# Patient Record
Sex: FEMALE | Race: WHITE | NOT HISPANIC OR LATINO | Employment: OTHER | URBAN - METROPOLITAN AREA
[De-identification: names, ages, dates, MRNs, and addresses within clinical notes are randomized per-mention and may not be internally consistent; named-entity substitution may affect disease eponyms.]

---

## 2017-01-23 ENCOUNTER — OFFICE VISIT - HEALTHEAST (OUTPATIENT)
Dept: MIDWIFE SERVICES | Facility: CLINIC | Age: 32
End: 2017-01-23

## 2017-01-23 DIAGNOSIS — Z30.431 IUD SURVEILLANCE: ICD-10-CM

## 2017-01-23 ASSESSMENT — MIFFLIN-ST. JEOR: SCORE: 1615.75

## 2017-02-20 ENCOUNTER — COMMUNICATION - HEALTHEAST (OUTPATIENT)
Dept: MIDWIFE SERVICES | Facility: CLINIC | Age: 32
End: 2017-02-20

## 2017-02-20 ENCOUNTER — COMMUNICATION - HEALTHEAST (OUTPATIENT)
Dept: FAMILY MEDICINE | Facility: CLINIC | Age: 32
End: 2017-02-20

## 2017-02-20 DIAGNOSIS — F32.A DEPRESSION: ICD-10-CM

## 2017-09-11 ENCOUNTER — COMMUNICATION - HEALTHEAST (OUTPATIENT)
Dept: FAMILY MEDICINE | Facility: CLINIC | Age: 32
End: 2017-09-11

## 2017-09-11 DIAGNOSIS — F32.A DEPRESSION: ICD-10-CM

## 2017-11-14 ENCOUNTER — COMMUNICATION - HEALTHEAST (OUTPATIENT)
Dept: FAMILY MEDICINE | Facility: CLINIC | Age: 32
End: 2017-11-14

## 2017-11-14 DIAGNOSIS — F32.A DEPRESSION: ICD-10-CM

## 2017-12-29 ENCOUNTER — RECORDS - HEALTHEAST (OUTPATIENT)
Dept: ADMINISTRATIVE | Facility: OTHER | Age: 32
End: 2017-12-29

## 2018-01-17 ENCOUNTER — COMMUNICATION - HEALTHEAST (OUTPATIENT)
Dept: ADMINISTRATIVE | Facility: CLINIC | Age: 33
End: 2018-01-17

## 2018-01-17 ENCOUNTER — COMMUNICATION - HEALTHEAST (OUTPATIENT)
Dept: FAMILY MEDICINE | Facility: CLINIC | Age: 33
End: 2018-01-17

## 2018-01-22 ENCOUNTER — AMBULATORY - HEALTHEAST (OUTPATIENT)
Dept: FAMILY MEDICINE | Facility: CLINIC | Age: 33
End: 2018-01-22

## 2018-01-25 ENCOUNTER — COMMUNICATION - HEALTHEAST (OUTPATIENT)
Dept: MIDWIFE SERVICES | Facility: CLINIC | Age: 33
End: 2018-01-25

## 2018-01-29 ENCOUNTER — COMMUNICATION - HEALTHEAST (OUTPATIENT)
Dept: MIDWIFE SERVICES | Facility: CLINIC | Age: 33
End: 2018-01-29

## 2018-02-26 ENCOUNTER — OFFICE VISIT - HEALTHEAST (OUTPATIENT)
Dept: MIDWIFE SERVICES | Facility: CLINIC | Age: 33
End: 2018-02-26

## 2018-02-26 ENCOUNTER — HOSPITAL ENCOUNTER (OUTPATIENT)
Dept: LAB | Age: 33
Setting detail: SPECIMEN
Discharge: HOME | End: 2018-02-26

## 2018-02-26 DIAGNOSIS — F43.21 SITUATIONAL DEPRESSION: ICD-10-CM

## 2018-02-26 DIAGNOSIS — Z01.419 WELL WOMAN EXAM: ICD-10-CM

## 2018-02-26 ASSESSMENT — MIFFLIN-ST. JEOR: SCORE: 1531.26

## 2018-02-27 LAB
HPV SOURCE: NORMAL
HUMAN PAPILLOMA VIRUS 16 DNA: NEGATIVE
HUMAN PAPILLOMA VIRUS 18 DNA: NEGATIVE
HUMAN PAPILLOMA VIRUS FINAL DIAGNOSIS: NORMAL
HUMAN PAPILLOMA VIRUS OTHER HR: NEGATIVE
SPECIMEN DESCRIPTION: NORMAL

## 2018-03-01 LAB
BKR LAB AP ABNORMAL BLEEDING: NO
BKR LAB AP BIRTH CONTROL/HORMONES: NORMAL
BKR LAB AP CERVICAL APPEARANCE: NORMAL
BKR LAB AP GYN ADEQUACY: NORMAL
BKR LAB AP GYN INTERPRETATION: NORMAL
BKR LAB AP HPV REFLEX: NORMAL
BKR LAB AP LMP: NORMAL
BKR LAB AP PATIENT STATUS: NORMAL
BKR LAB AP PREVIOUS ABNORMAL: NORMAL
BKR LAB AP PREVIOUS NORMAL: 2013
HIGH RISK?: NO
PATH REPORT.COMMENTS IMP SPEC: NORMAL
RESULT FLAG (HE HISTORICAL CONVERSION): NORMAL

## 2018-03-24 ENCOUNTER — COMMUNICATION - HEALTHEAST (OUTPATIENT)
Dept: FAMILY MEDICINE | Facility: CLINIC | Age: 33
End: 2018-03-24

## 2018-03-24 DIAGNOSIS — F32.A DEPRESSION: ICD-10-CM

## 2018-04-10 ENCOUNTER — COMMUNICATION - HEALTHEAST (OUTPATIENT)
Dept: MIDWIFE SERVICES | Facility: CLINIC | Age: 33
End: 2018-04-10

## 2018-04-10 ENCOUNTER — COMMUNICATION - HEALTHEAST (OUTPATIENT)
Dept: FAMILY MEDICINE | Facility: CLINIC | Age: 33
End: 2018-04-10

## 2018-08-21 ENCOUNTER — OFFICE VISIT - HEALTHEAST (OUTPATIENT)
Dept: FAMILY MEDICINE | Facility: CLINIC | Age: 33
End: 2018-08-21

## 2018-08-21 DIAGNOSIS — Z13.0 SCREENING FOR DEFICIENCY ANEMIA: ICD-10-CM

## 2018-08-21 DIAGNOSIS — F43.25 ADJUSTMENT REACTION WITH MIXED DISTURBANCE OF EMOTIONS AND CONDUCT: ICD-10-CM

## 2018-08-21 LAB
FERRITIN SERPL-MCNC: 31 NG/ML (ref 10–130)
IRON SATN MFR SERPL: 23 % (ref 20–50)
IRON SERPL-MCNC: 76 UG/DL (ref 42–175)
TIBC SERPL-MCNC: 332 UG/DL (ref 313–563)
TRANSFERRIN SERPL-MCNC: 265 MG/DL (ref 212–360)
VIT B12 SERPL-MCNC: 443 PG/ML (ref 213–816)

## 2018-08-21 ASSESSMENT — MIFFLIN-ST. JEOR: SCORE: 1508.58

## 2018-08-22 LAB — 25(OH)D3 SERPL-MCNC: 35.9 NG/ML (ref 30–80)

## 2018-08-26 ENCOUNTER — COMMUNICATION - HEALTHEAST (OUTPATIENT)
Dept: FAMILY MEDICINE | Facility: CLINIC | Age: 33
End: 2018-08-26

## 2018-12-31 ENCOUNTER — OFFICE VISIT - HEALTHEAST (OUTPATIENT)
Dept: FAMILY MEDICINE | Facility: CLINIC | Age: 33
End: 2018-12-31

## 2018-12-31 DIAGNOSIS — J02.0 STREPTOCOCCAL SORE THROAT: ICD-10-CM

## 2018-12-31 LAB — DEPRECATED S PYO AG THROAT QL EIA: ABNORMAL

## 2019-02-18 ENCOUNTER — COMMUNICATION - HEALTHEAST (OUTPATIENT)
Dept: MIDWIFE SERVICES | Facility: CLINIC | Age: 34
End: 2019-02-18

## 2019-06-14 ENCOUNTER — OFFICE VISIT - HEALTHEAST (OUTPATIENT)
Dept: FAMILY MEDICINE | Facility: CLINIC | Age: 34
End: 2019-06-14

## 2019-06-14 DIAGNOSIS — F41.9 ANXIETY: ICD-10-CM

## 2019-06-14 ASSESSMENT — MIFFLIN-ST. JEOR: SCORE: 1458.12

## 2019-06-17 ENCOUNTER — OFFICE VISIT - HEALTHEAST (OUTPATIENT)
Dept: MIDWIFE SERVICES | Facility: CLINIC | Age: 34
End: 2019-06-17

## 2019-06-17 DIAGNOSIS — Z01.419 WELL WOMAN EXAM: ICD-10-CM

## 2019-06-17 DIAGNOSIS — F43.21 SITUATIONAL DEPRESSION: ICD-10-CM

## 2019-06-17 DIAGNOSIS — Z23 NEED FOR VACCINATION: ICD-10-CM

## 2019-06-17 ASSESSMENT — MIFFLIN-ST. JEOR: SCORE: 1470.6

## 2019-08-05 ENCOUNTER — COMMUNICATION - HEALTHEAST (OUTPATIENT)
Dept: FAMILY MEDICINE | Facility: CLINIC | Age: 34
End: 2019-08-05

## 2019-08-05 DIAGNOSIS — F41.9 ANXIETY: ICD-10-CM

## 2019-08-07 ENCOUNTER — COMMUNICATION - HEALTHEAST (OUTPATIENT)
Dept: FAMILY MEDICINE | Facility: CLINIC | Age: 34
End: 2019-08-07

## 2019-08-08 ENCOUNTER — AMBULATORY - HEALTHEAST (OUTPATIENT)
Dept: FAMILY MEDICINE | Facility: CLINIC | Age: 34
End: 2019-08-08

## 2019-08-08 DIAGNOSIS — F43.21 SITUATIONAL DEPRESSION: ICD-10-CM

## 2019-08-08 DIAGNOSIS — F41.9 ANXIETY: ICD-10-CM

## 2019-09-05 ENCOUNTER — OFFICE VISIT - HEALTHEAST (OUTPATIENT)
Dept: BEHAVIORAL HEALTH | Facility: CLINIC | Age: 34
End: 2019-09-05

## 2019-09-05 DIAGNOSIS — F41.1 GENERALIZED ANXIETY DISORDER: ICD-10-CM

## 2019-09-05 DIAGNOSIS — F33.0 MAJOR DEPRESSIVE DISORDER, RECURRENT EPISODE, MILD (H): ICD-10-CM

## 2019-09-05 ASSESSMENT — ANXIETY QUESTIONNAIRES
1. FEELING NERVOUS, ANXIOUS, OR ON EDGE: MORE THAN HALF THE DAYS
IF YOU CHECKED OFF ANY PROBLEMS ON THIS QUESTIONNAIRE, HOW DIFFICULT HAVE THESE PROBLEMS MADE IT FOR YOU TO DO YOUR WORK, TAKE CARE OF THINGS AT HOME, OR GET ALONG WITH OTHER PEOPLE: VERY DIFFICULT
5. BEING SO RESTLESS THAT IT IS HARD TO SIT STILL: SEVERAL DAYS
4. TROUBLE RELAXING: MORE THAN HALF THE DAYS
GAD7 TOTAL SCORE: 9
7. FEELING AFRAID AS IF SOMETHING AWFUL MIGHT HAPPEN: SEVERAL DAYS
3. WORRYING TOO MUCH ABOUT DIFFERENT THINGS: SEVERAL DAYS
2. NOT BEING ABLE TO STOP OR CONTROL WORRYING: SEVERAL DAYS
6. BECOMING EASILY ANNOYED OR IRRITABLE: SEVERAL DAYS

## 2019-09-05 ASSESSMENT — PATIENT HEALTH QUESTIONNAIRE - PHQ9: SUM OF ALL RESPONSES TO PHQ QUESTIONS 1-9: 6

## 2019-10-10 ENCOUNTER — AMBULATORY - HEALTHEAST (OUTPATIENT)
Dept: BEHAVIORAL HEALTH | Facility: CLINIC | Age: 34
End: 2019-10-10

## 2019-11-11 ENCOUNTER — OFFICE VISIT - HEALTHEAST (OUTPATIENT)
Dept: FAMILY MEDICINE | Facility: CLINIC | Age: 34
End: 2019-11-11

## 2019-11-11 DIAGNOSIS — J06.9 VIRAL URI WITH COUGH: ICD-10-CM

## 2019-11-11 DIAGNOSIS — R07.0 THROAT PAIN: ICD-10-CM

## 2019-11-11 LAB — DEPRECATED S PYO AG THROAT QL EIA: NORMAL

## 2019-11-12 LAB — GROUP A STREP BY PCR: NORMAL

## 2020-02-27 ENCOUNTER — OFFICE VISIT - HEALTHEAST (OUTPATIENT)
Dept: BEHAVIORAL HEALTH | Facility: CLINIC | Age: 35
End: 2020-02-27

## 2020-02-27 ENCOUNTER — AMBULATORY - HEALTHEAST (OUTPATIENT)
Dept: BEHAVIORAL HEALTH | Facility: CLINIC | Age: 35
End: 2020-02-27

## 2020-02-27 DIAGNOSIS — F33.0 MAJOR DEPRESSIVE DISORDER, RECURRENT EPISODE, MILD (H): ICD-10-CM

## 2020-02-27 DIAGNOSIS — F41.1 GENERALIZED ANXIETY DISORDER: ICD-10-CM

## 2020-02-27 ASSESSMENT — ANXIETY QUESTIONNAIRES
IF YOU CHECKED OFF ANY PROBLEMS ON THIS QUESTIONNAIRE, HOW DIFFICULT HAVE THESE PROBLEMS MADE IT FOR YOU TO DO YOUR WORK, TAKE CARE OF THINGS AT HOME, OR GET ALONG WITH OTHER PEOPLE: VERY DIFFICULT
5. BEING SO RESTLESS THAT IT IS HARD TO SIT STILL: SEVERAL DAYS
2. NOT BEING ABLE TO STOP OR CONTROL WORRYING: MORE THAN HALF THE DAYS
7. FEELING AFRAID AS IF SOMETHING AWFUL MIGHT HAPPEN: MORE THAN HALF THE DAYS
4. TROUBLE RELAXING: MORE THAN HALF THE DAYS
GAD7 TOTAL SCORE: 13
1. FEELING NERVOUS, ANXIOUS, OR ON EDGE: MORE THAN HALF THE DAYS
3. WORRYING TOO MUCH ABOUT DIFFERENT THINGS: MORE THAN HALF THE DAYS
6. BECOMING EASILY ANNOYED OR IRRITABLE: MORE THAN HALF THE DAYS

## 2020-02-27 ASSESSMENT — PATIENT HEALTH QUESTIONNAIRE - PHQ9: SUM OF ALL RESPONSES TO PHQ QUESTIONS 1-9: 6

## 2020-03-16 ENCOUNTER — COMMUNICATION - HEALTHEAST (OUTPATIENT)
Dept: FAMILY MEDICINE | Facility: CLINIC | Age: 35
End: 2020-03-16

## 2020-03-16 DIAGNOSIS — F41.9 ANXIETY: ICD-10-CM

## 2020-07-28 ENCOUNTER — COMMUNICATION - HEALTHEAST (OUTPATIENT)
Dept: MIDWIFE SERVICES | Facility: CLINIC | Age: 35
End: 2020-07-28

## 2020-09-06 ENCOUNTER — VIRTUAL VISIT (OUTPATIENT)
Dept: FAMILY MEDICINE | Facility: OTHER | Age: 35
End: 2020-09-06

## 2020-09-06 ENCOUNTER — COMMUNICATION - HEALTHEAST (OUTPATIENT)
Dept: SCHEDULING | Facility: CLINIC | Age: 35
End: 2020-09-06

## 2020-09-06 NOTE — PROGRESS NOTES
"Date: 2020 12:46:28  Clinician: Sadaf Wolf  Clinician NPI: [id number]  Patient: Deanne Rico  Patient : 1985  Patient Address: [facility] [place] [address]  Patient Phone: [phone]  Visit Protocol: URI  Patient Summary:  Deanne is a 35 year old ( : 1985 ) female who initiated a Visit for COVID-19 (Coronavirus) evaluation and screening. When asked the question \"Please sign me up to receive news, health information and promotions. \", Deanne responded \"No\".    Deanne states her symptoms started 1-2 days ago.   Her symptoms consist of ear pain, facial pain or pressure, a headache, chills, malaise, and myalgia. Deanne also feels feverish.   Symptom details     Temperature: Her current temperature is 100.4 degrees Fahrenheit. Deanne has had a temperature over 100 degrees Fahrenheit for 1-2 days.     Facial pain or pressure: The facial pain or pressure does not feel worse when bending or leaning forward.     Headache: She states the headache is mild (1-3 on a 10 point pain scale).      Deanne denies having anosmia, vomiting, rhinitis, nausea, wheezing, sore throat, teeth pain, ageusia, diarrhea, cough, and nasal congestion. She also denies taking antibiotic medication in the past month, having recent facial or sinus surgery in the past 60 days, and having a sinus infection within the past year. She is not experiencing dyspnea.   Precipitating events  She has not recently been exposed to someone with influenza. Deanne has been in close contact with the following high risk individuals: children under the age of 5, adults 65 or older, and people with asthma, heart disease or diabetes.   Pertinent COVID-19 (Coronavirus) information  In the past 14 days, Deanne has not worked in a congregate living setting.   She does not work or volunteer as healthcare worker or a  and does not work or volunteer in a healthcare facility.   Deanne also has not lived in a congregate living setting " in the past 14 days. She does not live with a healthcare worker.   Deanne has not had a close contact with a laboratory-confirmed COVID-19 patient within 14 days of symptom onset.   Since December 2019, Deanne and has not had upper respiratory infection or influenza-like illness. Has not been diagnosed with lab-confirmed COVID-19 test   Pertinent medical history  Deanne does not get yeast infections when she takes antibiotics.   Deanne does not need a return to work/school note.   Weight: 150 lbs   Deanne does not smoke or use smokeless tobacco.   She denies pregnancy and denies breastfeeding. She has menstruated in the past month.   Additional information as reported by the patient (free text): I want to be tested for covid-19 asap   Weight: 150 lbs    MEDICATIONS: Pristiq oral, ALLERGIES: NKDA  Clinician Response:  Dear Deanne,   Your symptoms show that you may have coronavirus (COVID-19). This illness can cause fever, cough and trouble breathing. Many people get a mild case and get better on their own. Some people can get very sick.  Based on the symptoms you have shared, I would like you to be re-checked in 2 to 3 days. Please call your family clinic to set up a video or phone visit.  Will I be tested for COVID-19?  We would like to test you for this virus.   Please call [phone] to schedule your visit. Explain that you were referred by [facility] to have a COVID-19 test. Be ready to [facility].   The following will serve as your written order for this COVID Test, ordered by me, for the indication of suspected COVID [Z20.828]: The test will be ordered in [employer], our electronic health record, after you are scheduled. It will show as ordered and authorized by Don Rivera MD.  Order: COVID-19 (Coronavirus) PCR for SYMPTOMATIC testing from [facility].  1.When it's time for your COVID test:   Stay at least 6 feet away from others. (If someone will drive you to your test, stay in the backseat, as far away  "from the  as you can.)   Cover your mouth and nose with a mask, tissue or washcloth.  Go straight to the testing site. Don't make any stops on the way there or back.      2.Starting now: Stay home and away from others (self-isolate) until:   You've had no fever---and no medicine that reduces fever---for one full day (24 hours). And...   Your other symptoms have gotten better. For example, your cough or breathing has improved. And...   At least 10 days have passed since your symptoms started.       During this time, don't leave the house except for testing or medical care.   Stay in your own room, even for meals. Use your own bathroom if you can.   Stay away from others in your home. No hugging, kissing or shaking hands. No visitors.  Don't go to work, school or anywhere else.    Clean \"high touch\" surfaces often (doorknobs, counters, handles, etc.). Use a household cleaning spray or wipes. You'll find a full list of  on the EPA website: www.epa.gov/pesticide-registration/list-n-disinfectants-use-against-sars-cov-2.   Cover your mouth and nose with a mask, tissue or washcloth to avoid spreading germs.  Wash your hands and face often. Use soap and water.  Caregivers in these groups are at risk for severe illness due to COVID-19:  o People 65 years and older  o People who live in a nursing home or long-term care facility  o People with chronic disease (lung, heart, cancer, diabetes, kidney, liver, immunologic)   o People who have a weakened immune system, including those who:   Are in cancer treatment  Take medicine that weakens the immune system, such as corticosteroids  Had a bone marrow or organ transplant  Have an immune deficiency  Have poorly controlled HIV or AIDS  Are obese (body mass index of 40 or higher)  Smoke regularly   o Caregivers should wear gloves while washing dishes, handling laundry and cleaning bedrooms and bathrooms.  o Use caution when washing and drying laundry: Don't shake dirty " laundry, and use the warmest water setting that you can.  o For more tips, go to www.cdc.gov/coronavirus/2019-ncov/downloads/10Things.pdf.      How can I take care of myself?   Get lots of rest. Drink extra fluids (unless a doctor has told you not to)   Take Tylenol (acetaminophen) for fever or pain. If you have liver or kidney problems, ask your family doctor if it's okay to take Tylenol.   Adults can take either:    650 mg (two 325 mg pills) every 4 to 6 hours, or...   1,000 mg (two 500 mg pills) every 8 hours as needed.    Note: Don't take more than 3,000 mg in one day. Acetaminophen is found in many medicines (both prescribed and over-the-counter medicines). Read all labels to be sure you don't take too much.   For children, check the Tylenol bottle for the right dose. The dose is based on the child's age or weight.    If you have other health problems (like cancer, heart failure, an organ transplant or severe kidney disease): Call your specialty clinic if you don't feel better in the next 2 days.       Know when to call 911. Emergency warning signs include:    Trouble breathing or shortness of breath Pain or pressure in the chest that doesn't go away Feeling confused like you haven't felt before, or not being able to wake up Bluish-colored lips or face  Where can I get more information?   [facility] -- About COVID-19: [email or web address]   CDC -- What to Do If You're Sick: www.cdc.gov/coronavirus/2019-ncov/about/steps-when-sick.html   CDC -- Ending Home Isolation: www.cdc.gov/coronavirus/2019-ncov/hcp/disposition-in-home-patients.html   CDC -- Caring for Someone: www.cdc.gov/coronavirus/2019-ncov/if-you-are-sick/care-for-someone.html   [facility] -- Interim Guidance for Hospital Discharge to Home: www.health.[facility].mn.us/diseases/coronavirus/hcp/hospdischarge.pdf   [facility] clinical trials (COVID-19 research studies): clinicalaffairs.[facility]-clinical-trials    Below are the  COVID-19 hotlines at the [place] Department of Health ([facility]). Interpreters are available.    For health questions: Call 842-423-5908 or 1-277.847.1317 (7 a.m. to 7 p.m.) For questions about schools and childcare: Call 808-354-1293 or 1-452.536.4193 (7 a.m. to 7 p.m.)       Diagnosis: Acute suppurative otitis media without spontaneous rupture of ear drum, right ear  Diagnosis ICD: H66.001  Prescription: amoxicillin 875 mg oral tablet 20 tablet, 10 days supply. Take 1 tablet by mouth every 12 hours for 10 days. Refills: 0, Refill as needed: no, Allow substitutions: yes

## 2020-09-08 ENCOUNTER — AMBULATORY - HEALTHEAST (OUTPATIENT)
Dept: INTERNAL MEDICINE | Facility: CLINIC | Age: 35
End: 2020-09-08

## 2020-09-08 ENCOUNTER — AMBULATORY - HEALTHEAST (OUTPATIENT)
Dept: FAMILY MEDICINE | Facility: CLINIC | Age: 35
End: 2020-09-08

## 2020-09-08 DIAGNOSIS — Z20.822 SUSPECTED 2019 NOVEL CORONAVIRUS INFECTION: ICD-10-CM

## 2020-09-10 ENCOUNTER — COMMUNICATION - HEALTHEAST (OUTPATIENT)
Dept: SCHEDULING | Facility: CLINIC | Age: 35
End: 2020-09-10

## 2020-09-30 ENCOUNTER — COMMUNICATION - HEALTHEAST (OUTPATIENT)
Dept: FAMILY MEDICINE | Facility: CLINIC | Age: 35
End: 2020-09-30

## 2020-09-30 DIAGNOSIS — F41.9 ANXIETY: ICD-10-CM

## 2020-10-13 ENCOUNTER — COMMUNICATION - HEALTHEAST (OUTPATIENT)
Dept: FAMILY MEDICINE | Facility: CLINIC | Age: 35
End: 2020-10-13

## 2020-10-23 ENCOUNTER — COMMUNICATION - HEALTHEAST (OUTPATIENT)
Dept: FAMILY MEDICINE | Facility: CLINIC | Age: 35
End: 2020-10-23

## 2020-11-15 ENCOUNTER — VIRTUAL VISIT (OUTPATIENT)
Dept: FAMILY MEDICINE | Facility: OTHER | Age: 35
End: 2020-11-15

## 2020-11-15 ENCOUNTER — AMBULATORY - HEALTHEAST (OUTPATIENT)
Dept: FAMILY MEDICINE | Facility: CLINIC | Age: 35
End: 2020-11-15

## 2020-11-15 DIAGNOSIS — Z20.822 SUSPECTED COVID-19 VIRUS INFECTION: ICD-10-CM

## 2020-11-15 NOTE — PROGRESS NOTES
"Date: 11/15/2020 12:08:12  Clinician: Pelon Ugalde  Clinician NPI: [id number]  Patient: Deanne Rico  Patient : 1985  Patient Address: [facility] [place] [address] [place] [address]  Patient Phone: [phone]  Visit Protocol: URI  Patient Summary:  Deanne is a 35 year old ( : 1985 ) female who initiated a [facility] Visit for COVID-19 (Coronavirus) evaluation and screening. When asked the question \"Please sign me up to receive news, health information and promotions. \", Deanne responded \"No\".    Deanne states her symptoms started 1-2 days ago.   Her symptoms consist of rhinitis, myalgia, chills, malaise, a sore throat, ageusia, ear pain, a cough, nasal congestion, and anosmia. She is experiencing difficulty breathing due to nasal congestion but she is not short of breath.   Symptom details     Nasal secretions: The color of her mucus is white and clear.    Cough: Deanne coughs a few times an hour and her cough is more bothersome at night. Phlegm comes into her throat when she coughs. She believes her cough is caused by post-nasal drip. The color of the phlegm is white and clear.     Sore throat: Deanne reports having mild throat pain (1-3 on a 10 point pain scale), does not have exudate on her tonsils, and can swallow liquids. She is not sure if the lymph nodes in her neck are enlarged. A rash has not appeared on the skin since the sore throat started.      Deanne denies having vomiting, facial pain or pressure, teeth pain, diarrhea, headache, wheezing, fever, and nausea. She also denies taking antibiotic medication in the past month and having recent facial or sinus surgery in the past 60 days.   Precipitating events  Within the past week, Deanne has not been exposed to someone with strep throat. She has not recently been exposed to someone with influenza. Deanne has been in close contact with the following high risk individuals: children under the age of 5 and people with asthma, heart disease or diabetes.   " Pertinent COVID-19 (Coronavirus) information  Deanne does not work or volunteer as healthcare worker or a . In the past 14 days, Deanne has not worked or volunteered at a healthcare facility or group living setting.   In the past 14 days, she also has not lived in a congregate living setting.   Deanne has not had a close contact with a laboratory-confirmed COVID-19 patient within 14 days of symptom onset.    Since December 2019, Deanne has been tested for COVID-19 and has not had upper respiratory infection or influenza-like illness.      Result of COVID-19 test: Negative     Date of her COVID-19 test: 09/08/2020      Pertinent medical history  Deanne does not get yeast infections when she takes antibiotics.   Deanne does not need a return to work/school note.   Weight: 150 lbs   Deanne does not smoke or use smokeless tobacco.   She denies pregnancy and denies breastfeeding. She is currently menstruating.   Weight: 150 lbs    MEDICATIONS: Pristiq oral, ALLERGIES: NKDA  Clinician Response:  Dear Deanne,   Your symptoms show that you may have coronavirus (COVID-19). This illness can cause fever, cough and trouble breathing. Many people get a mild case and get better on their own. Some people can get very sick.  What should I do?  We would like to test you for this virus.   1. Please call [phone] to schedule your visit. Explain [facility]. Be ready to share your [facility] visit ID number.  * If you need to schedule in [facility] [place] please call [phone] or for [employer] employees please call [phone].  * If you need to schedule in the [place] area please call [phone]. [employer] employees call [phone].  The following will serve as your written order for this COVID Test, ordered by me, for the indication of suspected COVID [Z20.828]: The test will be ordered in [employer], our electronic health record, after you are scheduled. It will show as ordered and  "authorized by Don Rivera MD.  Order: COVID-19 (Coronavirus) PCR for SYMPTOMATIC testing from [facility].   2. When it's time for your COVID test:  Stay at least 6 feet away from others. (If someone will drive you to your test, stay in the backseat, as far away from the  as you can.)   Cover your mouth and nose with a mask, tissue or washcloth.  Go straight to the testing site. Don't make any stops on the way there or back.      3.Starting now: Stay home and away from others (self-isolate) until:   You've had no fever---and no medicine that reduces fever---for one full day (24 hours). And...   Your other symptoms have gotten better. For example, your cough or breathing has improved. And...   At least 10 days have passed since your symptoms started.       During this time, don't leave the house except for testing or medical care.   Stay in your own room, even for meals. Use your own bathroom if you can.   Stay away from others in your home. No hugging, kissing or shaking hands. No visitors.  Don't go to work, school or anywhere else.    Clean \"high touch\" surfaces often (doorknobs, counters, handles, etc.). Use a household cleaning spray or wipes. You'll find a full list of  on the EPA website: www.epa.gov/pesticide-registration/list-n-disinfectants-use-against-sars-cov-2.   Cover your mouth and nose with a mask, tissue or washcloth to avoid spreading germs.  Wash your hands and face often. Use soap and water.  Caregivers in these groups are at risk for severe illness due to COVID-19:  o People 65 years and older  o People who live in a nursing home or long-term care facility  o People with chronic disease (lung, heart, cancer, diabetes, kidney, liver, immunologic)  o People who have a weakened immune system, including those who:   Are in cancer treatment  Take medicine that weakens the immune system, such as corticosteroids  Had a bone marrow or organ transplant  Have an immune deficiency  Have poorly " controlled HIV or AIDS  Are obese (body mass index of 40 or higher)  Smoke regularly   o Caregivers should wear gloves while washing dishes, handling laundry and cleaning bedrooms and bathrooms.  o Use caution when washing and drying laundry: Don't shake dirty laundry, and use the warmest water setting that you can.  o For more tips, go to www.cdc.gov/coronavirus/2019-ncov/downloads/10Things.pdf.    4.Sign up for [employer]. We know it's scary to hear that you might have COVID-19. We want to track your symptoms to make sure you're okay over the next 2 weeks. Please look for an email from [employer]---this is a free, online program that we'll use to keep in touch. To sign up, follow the link in the email. Learn more at [email or web address]  How can I take care of myself?   Get lots of rest. Drink extra fluids (unless a doctor has told you not to).   Take Tylenol (acetaminophen) for fever or pain. If you have liver or kidney problems, ask your family doctor if it's okay to take Tylenol.   Adults can take either:    650 mg (two 325 mg pills) every 4 to 6 hours, or...   1,000 mg (two 500 mg pills) every 8 hours as needed.    Note: Don't take more than 3,000 mg in one day. Acetaminophen is found in many medicines (both prescribed and over-the-counter medicines). Read all labels to be sure you don't take too much.   For children, check the Tylenol bottle for the right dose. The dose is based on the child's age or weight.    If you have other health problems (like cancer, heart failure, an organ transplant or severe kidney disease): Call your specialty clinic if you don't feel better in the next 2 days.       Know when to call 911. Emergency warning signs include:    Trouble breathing or shortness of breath Pain or pressure in the chest that doesn't go away Feeling confused like you haven't felt before, or not being able to wake up Bluish-colored lips or face.  Where can I get more information?   [facility]  [place] -- About COVID-19: [email or web address]   CDC -- What to Do If You're Sick: www.cdc.gov/coronavirus/2019-ncov/about/steps-when-sick.html   CDC -- Ending Home Isolation: www.cdc.gov/coronavirus/2019-ncov/hcp/disposition-in-home-patients.html   [facility] -- Caring for Someone: www.cdc.gov/coronavirus/2019-ncov/if-you-are-sick/care-for-someone.html   [facility] -- Interim Guidance for Hospital Discharge to Home: www.[facility].mn./diseases/coronavirus/hcp/hospdischarge.pdf   [facility] clinical trials (COVID-19 research studies): clinicalaffairs.[facility]-clinical-trials    Below are the COVID-19 hotlines at the [place] Department of Health ([facility]). Interpreters are available.    For health questions: Call 930-805-2827 or 1-408.120.1165 (7 a.m. to 7 p.m.) For questions about schools and childcare: Call 206-996-0752 or 1-909.803.6544 (7 a.m. to 7 p.m.)    Diagnosis: Cough  Diagnosis ICD: R05

## 2020-11-17 ENCOUNTER — COMMUNICATION - HEALTHEAST (OUTPATIENT)
Dept: SCHEDULING | Facility: CLINIC | Age: 35
End: 2020-11-17

## 2021-04-21 ENCOUNTER — COMMUNICATION - HEALTHEAST (OUTPATIENT)
Dept: FAMILY MEDICINE | Facility: CLINIC | Age: 36
End: 2021-04-21

## 2021-04-22 ENCOUNTER — COMMUNICATION - HEALTHEAST (OUTPATIENT)
Dept: FAMILY MEDICINE | Facility: CLINIC | Age: 36
End: 2021-04-22

## 2021-04-22 DIAGNOSIS — F41.9 ANXIETY: ICD-10-CM

## 2021-04-27 ENCOUNTER — COMMUNICATION - HEALTHEAST (OUTPATIENT)
Dept: FAMILY MEDICINE | Facility: CLINIC | Age: 36
End: 2021-04-27

## 2021-05-04 ENCOUNTER — COMMUNICATION - HEALTHEAST (OUTPATIENT)
Dept: FAMILY MEDICINE | Facility: CLINIC | Age: 36
End: 2021-05-04

## 2021-05-26 ASSESSMENT — PATIENT HEALTH QUESTIONNAIRE - PHQ9: SUM OF ALL RESPONSES TO PHQ QUESTIONS 1-9: 6

## 2021-05-27 ASSESSMENT — PATIENT HEALTH QUESTIONNAIRE - PHQ9: SUM OF ALL RESPONSES TO PHQ QUESTIONS 1-9: 6

## 2021-05-28 ASSESSMENT — ANXIETY QUESTIONNAIRES
GAD7 TOTAL SCORE: 13
GAD7 TOTAL SCORE: 9

## 2021-05-29 NOTE — PROGRESS NOTES
ASSESSMENT & PLAN    Significant anxiety and depression  OTC supplements as adjunct  Therapy would like her to connect with Janelle Mitchell  Would be a great fit.  Pristiq 25    No problem-specific Assessment & Plan notes found for this encounter.      Deanne was seen today for mental health problem.    Diagnoses and all orders for this visit:    Anxiety  -     desvenlafaxine succinate 25 mg Tb24; Take 50 mg by mouth daily.    Other orders  -     Tdap vaccine,  8yo or older,  IM        Patient Instructions   Supplements for adrenal support  These can be used in addition to lifestyle interventions such as mindfulness exercises, meditation, cardiovascular low impact exercise, and restorative sleep      Therapies for Supporting  Mood Regulation     5 HTP 50 -400 mg Daily  L Taurine 100- 1000 mg Daily  Pharma TOYA 100- 400 mg Daily  L Theanine 100 - 600 mg Daily  Green tea  L Tyrosine  200 - 2000 mg Daily  Magnesium Glycinate or Citrate 400 mg Daily  5 Methytetrahydrofolate 400 mcg to 1 mg daily  Inositol 1000 0 91265 mg daily    Ashwagandah   500 mg Twice daily         Pristiq  25 mg 1 daily     Janelle Mitchell:   Psychology  Here           No follow-ups on file.            CHIEF COMPLAINT: Deanne Rico had concerns including Mental Health Problem.    [place]: 1.............. had concerns including Mental Health Problem.    1. Anxiety          CC:             Why are you here today?                              Mental health concerns    Feel like Failure  Changes  and she business  Stress  Feels like she is failure  Hard time holding it together.  Occasional Thoughts of suicide   Contracts for safety       Stopped medications months back          Any other Problems in order of Priority:        SUBJECTIVE:  Deanne Rico is a 34 y.o. female    Past Medical History:   Diagnosis Date     Anemia      Herniated lumbar disc without myelopathy 12/2017     Postpartum depression     postpartum depression only. On Lexapro 10 mg  currently.     Trauma     sep symphsis pubis from preg, no traum/viol     Varicella     as a child     Varicosities      Past Surgical History:   Procedure Laterality Date     WISDOM TOOTH EXTRACTION      14 y/o     Patient has no known allergies.  Current Outpatient Medications   Medication Sig Dispense Refill     cholecalciferol, vitamin D3, (VITAMIN D3 ORAL) Take by mouth.       copper (PARAGARD T 380A) 380 square mm IUD IUD 1 each by Intrauterine route once.       desvenlafaxine succinate 25 mg Tb24 Take 50 mg by mouth daily. 30 tablet 2     No current facility-administered medications for this visit.      Family History   Problem Relation Age of Onset     No Medical Problems Mother      Hypertension Father      Arthritis Father      No Medical Problems Sister      No Medical Problems Brother      Breast cancer Maternal Aunt      Cancer Paternal Aunt      Arthritis Maternal Grandmother      Dementia Maternal Grandfather      No Medical Problems Paternal Grandmother      Kidney disease Paternal Grandfather      Social History     Socioeconomic History     Marital status:      Spouse name: Farrukh     Number of children: 4     Years of education: college     Highest education level: None   Occupational History     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/   Social Needs     Financial resource strain: None     Food insecurity:     Worry: None     Inability: None     Transportation needs:     Medical: None     Non-medical: None   Tobacco Use     Smoking status: Never Smoker     Smokeless tobacco: Never Used   Substance and Sexual Activity     Alcohol use: No     Drug use: No     Sexual activity: Yes     Partners: Male     Birth control/protection: IUD   Lifestyle     Physical activity:     Days per week: None     Minutes per session: None     Stress: None   Relationships     Social connections:     Talks on phone: None     Gets together: None     Attends Sabianist service: None     Active member of club or  "organization: None     Attends meetings of clubs or organizations: None     Relationship status: None     Intimate partner violence:     Fear of current or ex partner: None     Emotionally abused: None     Physically abused: None     Forced sexual activity: None   Other Topics Concern     None   Social History Narrative     None     Patient Active Problem List   Diagnosis     Situational depression                                              SOCIAL: She  reports that she has never smoked. She has never used smokeless tobacco. She reports that she does not drink alcohol or use drugs.    REVIEW OF SYSTEMS:   Family history not pertinent to chief complaint or presenting problem    Review of Systems:      Nervous System:  No headache, paresthesia or dizziness or fainting                                  Ears: No hearing loss or ringing in the ears    Eyes: No blurring of vision, Double Vision            No redness, itching or dryness.    Nose: No nosebleed or loss of smell    Mouth: No mouth sores or  coated tongue    Throat: No hoarseness or difficulty swallowing    Neck: No enlarged thyroid or lymph nodes.    Heart: No chest pain, palpitation or irregular heartbeat.                  Lungs: No shortness of breath, wheezing or hemoptysis.    Gastrointestinal: No nausea or vomiting, melena or blood in stools.    Kidney/Bladdr: No polyuria, polydipsia, or hematuria.                             Genital/Sexual: No Sex function Changes                                Skin: No rash    Muscles/Joints/Bones: No Muscle morning stiffness, No Effusion of a Joint     Review of systems otherwise negative as requested from patient, except   Those positive ROS outlined and discussed in [place].    OBJECTIVE:  BP 95/61 (Patient Site: Right Arm, Patient Position: Sitting, Cuff Size: Adult Regular)   Pulse 79   Ht 5' 10.5\" (1.791 m)   Wt 151 lb (68.5 kg)   LMP 06/11/2019   SpO2 100%   Breastfeeding? No   BMI 21.36 kg/m      GENERAL: "     No acute distress.   Alert and oriented X 3         Physical:    Articulate  No Pressured speech  No tremor  Intermittent tearful        ASSESSMENT & PLAN      Deanne was seen today for mental health problem.    Diagnoses and all orders for this visit:    Anxiety  -     desvenlafaxine succinate 25 mg Tb24; Take 50 mg by mouth daily.    Other orders  -     Tdap vaccine,  8yo or older,  IM        No follow-ups on file.       Anticipatory Guidance and Symptomatic Cares Discussed   Advised to call back directly if there are further questions, or if these symptoms fail to improve as anticipated or worsen.  Return to clinic if patient has a clinical concern that warrants an exam.         I spent 25 minutes with this patient face to face, of which 50% or greater was spent in counseling and coordination of care with regards to Deanne was seen today for mental health problem.    Diagnoses and all orders for this visit:    Anxiety  -     desvenlafaxine succinate 25 mg Tb24; Take 50 mg by mouth daily.    Other orders  -     Tdap vaccine,  8yo or older,  IM        Vimal Gentile MD  [facility] [phone]

## 2021-05-29 NOTE — PROGRESS NOTES
"Annual Health Maintenance Exam    Subjective:     Deanne is a 34 y.o. female who presents for an annual exam.  She is an established patient to the [facility]. She reports having a physically healthy year, however emotionally has had challenges in the past 6 -8 weeks.  She has a history of anxiety and depression, particularly in the postpartum period after her last delivery.  She reports much of the anxiety is related to situational issues around financial stability and family stressors of unstable employment.  She was started on Lexapro a few years ago, and decided to take herself off in April of 2018.  She has since seen her PCP Dr. Gentile, who restarted her on Pristiq (SSNI) last week.  She has had a few doses of the new medication, and feels she has not noted significant side effects but feels she needs more time taking the medication before determining if its a good fit.  She feels stable now, denies SI and HI.  She does have a counselor that she sees periodically with her , and they are involved in their Mu-ism community that provides support.  She was previously \"unschooling\" her 4 children until last fall when she enrolled them into traditional school setting which has been stressful for her as the schools have not been a great fit.  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  Her  is currently seeking full time employment.   Deanne reports regular menstrual cycles, bleeding 4-5 days with minimal cramping.  She uses a Paraguard for birth control.  She is sexually active with one partner, her , and reports mutual monogamy.  She declines STI testing today.  She is not fasting for the visit, and is due for fasting lipid profile.  Other [facility] labs are WNL.  She mentioned she was interested in baseline labs for overall health, as mentioned by " her Dr. Gentile at her last visit.  Discussed other lab work that could be collected including CBC and thyroid labs.      Allergies  Patient has no known allergies.    Current Medications  Current Outpatient Medications   Medication Sig Dispense Refill     cholecalciferol, vitamin D3, (VITAMIN D3 ORAL) Take by mouth.       copper (PARAGARD T 380A) 380 square mm IUD IUD 1 each by Intrauterine route once.       desvenlafaxine succinate 25 mg Tb24 Take 50 mg by mouth daily. 30 tablet 2     No current facility-administered medications for this visit.        Past Medical/Surgical History  Past Medical History:   Diagnosis Date     Anemia      Herniated lumbar disc without myelopathy 2017     Postpartum depression     postpartum depression only. On Lexapro 10 mg currently.     Trauma     sep symphsis pubis from preg, no traum/viol     Varicella     as a child     Varicosities      Past Surgical History:   Procedure Laterality Date     WISDOM TOOTH EXTRACTION      14 y/o       Obstetric and Gynecologic History  Patient's last menstrual period was 2019.    OB History    Para Term  AB Living   4 4 4 0 0 4   SAB TAB Ectopic Multiple Live Births   0 0 0 0 4      # Outcome Date GA Lbr Pantera/2nd Weight Sex Delivery Anes PTL Lv   4 Term 10/10/16 40w2d  8 lb 5 oz (3.771 kg) M Vag-Spont  N MARTINEZ   3 Term 06/10/14 40w1d 07:45 / 00:05 7 lb 3 oz (3.26 kg) M Water Birth None N MARTINEZ   2 Term 11   8 lb 3 oz (3.714 kg) F Vag-Spont None N MARTINEZ   1 Term 08   8 lb 3 oz (3.714 kg) F Vag-Spont None N MARTINEZ       Last Pap: 2018.  Results were: normal, Negative HPV  Last mammogram: NA.     Immunization History  Status updated.    Family History  Family History   Problem Relation Age of Onset     No Medical Problems Mother      Hypertension Father      Arthritis Father      No Medical Problems Sister      No Medical Problems Brother      Breast cancer Maternal Aunt      Cancer Paternal Aunt      Arthritis Maternal  Grandmother      Dementia Maternal Grandfather      No Medical Problems Paternal Grandmother      Kidney disease Paternal Grandfather        Health Maintenance  Diet:  general  Regular Exercise: Yes.  Yoga, pilates, PT exercises for back injury in 2017.    Caffeine use:  no  Sunscreen Use: Yes.   Dental Visits Regularly: Yes  Seat Belt: No  Self Breast Exam Monthly:Yes  Abuse History:  Does not have a history of abuse.  Currently does feel safe in all her relationships.      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/   Social Needs     Financial resource strain: Not on file     Food insecurity:     Worry: Not on file     Inability: Not on file     Transportation needs:     Medical: Not on file     Non-medical: Not on file   Tobacco Use     Smoking status: Never Smoker     Smokeless tobacco: Never Used   Substance and Sexual Activity     Alcohol use: No     Drug use: No     Sexual activity: Yes     Partners: Male     Birth control/protection: IUD   Lifestyle     Physical activity:     Days per week: Not on file     Minutes per session: Not on file     Stress: Not on file   Relationships     Social connections:     Talks on phone: Not on file     Gets together: Not on file     Attends Episcopalian service: Not on file     Active member of club or organization: Not on file     Attends meetings of clubs or organizations: Not on file     Relationship status: Not on file     Intimate partner violence:     Fear of current or ex partner: Not on file     Emotionally abused: Not on file     Physically abused: Not on file     Forced sexual activity: Not on file   Other Topics Concern     Not on file   Social History Narrative     Not on file       Further Screening History  Colonoscopy: NA.  Lipid  "Profile: due.  Glucose Screen: pregnancy 2016.  Last Dexa: NA.    Review of Systems  A 12 point comprehensive review of systems was negative except as noted.    Objective:         Vitals:    06/17/19 1331   BP: 92/70   Pulse: 76   Weight: 152 lb (68.9 kg)   Height: 5' 11\" (1.803 m)       Physical Exam:  General: Pleasant, articulate, well-groomed, well-nourished female.  Not in any apparent distress.  Neck: Supple.  Thyroid: Small, symmetrical, no nodules noted.  Lymphadenopathy: Negative.  Lungs: Clear to auscultation bilaterally, and a nonlabored breathing pattern.  Cardio: Regular rate and rhythm, negative for murmur.   Breasts: Symmetrical, nontender, no masses, negative lymphadenopathy, negative nipple discharge.  Abdomen: Soft, nontender, no masses, negative CVAT.  External genitalia: Normal hair distribution, no lesions.  Urethral opening: Without lesions, or tenderness.   Bladder: Without masses, or tenderness.  Vagina: Pink, rugated, normal-appearing discharge.  Cervix: posterior, pink, smooth, no lesions.  Pap smear not obtained. IUD strings palpated.  Bimanual: Finds uterus small anteverted, mobile, nontender, no masses.  Negative CMT.  Adnexa: without masses or tenderness.    Lower extremities: +1 reflexes, no significant edema.      Assessment / Plan:     1) Annual health maintenance exam generally within normal limits today.  2) Anxiety and depression, managed by PCP with SSNI.      1. Discussed nutrition and exercise.  Advised MVI, Vitamin D3 4000IU geltab and an omega 3 supplement daily.  Accepts the following HM labs: fasting lipid profile, BMP and TSH ordered as future labs.   Breast self exam technique reviewed and patient encouraged to perform self-exam monthly. Contraception: Paraguard.  Next pap due 2023. HPV co-testing discussed with that pap, then paps q 5 yrs if both negative.  2. Discussed lifestyle and physical management of anxiety and depression.  Reviewed medication effectiveness, as " well as management of side effects if needed.  Encouraged Deanne to consider CBT or ACT (acceptance and commitment therapy) to help manage her anxious thoughts and resulting depression.  Reviewed importance of therapy in conjunction with medication management to develop tools for managing anxiety.  She is open to this, and will consider looking at therapists for herself.  3. Reviewed [facility] labs that were ordered, discussed how to schedule lab only visit.  4.  RTC 1 year for annual well woman exam, or PRN.  Continue care with PCP as indicated for management of anxiety/depression and other medical needs.    TT = 40 minutes of time of which >50% on education/counseling/care-coordination

## 2021-05-29 NOTE — PATIENT INSTRUCTIONS - HE
Supplements for adrenal support  These can be used in addition to lifestyle interventions such as mindfulness exercises, meditation, cardiovascular low impact exercise, and restorative sleep      Therapies for Supporting  Mood Regulation     5 HTP 50 -400 mg Daily  L Taurine 100- 1000 mg Daily  Pharma TOYA 100- 400 mg Daily  L Theanine 100 - 600 mg Daily  Green tea  L Tyrosine  200 - 2000 mg Daily  Magnesium Glycinate or Citrate 400 mg Daily  5 Methytetrahydrofolate 400 mcg to 1 mg daily  Inositol 1000 0 18401 mg daily    Ashwagandah   500 mg Twice daily         Pristiq  25 mg 1 daily     Janelle Mitchell:   Psychology  Here

## 2021-05-30 VITALS — WEIGHT: 184 LBS | HEIGHT: 71 IN | BODY MASS INDEX: 25.76 KG/M2

## 2021-05-31 NOTE — TELEPHONE ENCOUNTER
RN cannot approve Refill Request    RN can NOT refill this medication Protocol failed and NO refill given. Last office visit: 6/14/2019 Vimal Gentile MD Last Physical: Visit date not found Last MTM visit: Visit date not found Last visit same specialty: 6/14/2019 Vimal Gentile MD.  Next visit within 3 mo: Visit date not found  Next physical within 3 mo: Visit date not found      Francine Hensley, [facility] Triage/Med Refill 8/6/2019    Requested Prescriptions   Pending Prescriptions Disp Refills     desvenlafaxine succinate 25 mg Tb24 [Pharmacy Med Name: DESVENLAFAXINE ER SUCCINATE 25MG T] 30 tablet 0     Sig: TAKE 2 TABLETS BY MOUTH DAILY       Venlafaxine/Desvenlafaxine Refill Protocol Failed - 8/5/2019  4:24 PM        Failed - LFT or AST or ALT in last year     Albumin   Date Value Ref Range Status   06/24/2011 4.4 3.5 - 5.0 g/dL Final     Bilirubin, Total   Date Value Ref Range Status   06/24/2011 0.6 <1.1 mg/dL Final     Alkaline Phosphatase   Date Value Ref Range Status   06/24/2011 82 45 - 120 IU/L Final     AST   Date Value Ref Range Status   06/24/2011 11 <41 IU/L Final     ALT   Date Value Ref Range Status   06/24/2011 11 <46 IU/L Final     Protein, Total   Date Value Ref Range Status   06/24/2011 7.3 6.0 - 8.0 g/dL Final                Failed - Fasting lipid cascade in last year     Patient Fasting > 8hrs?   Date Value Ref Range Status   07/18/2016 Yes  Final             Passed - PCP or prescribing provider visit in last year     Last office visit with prescriber/PCP: 6/14/2019 Vimal Gentile MD OR same dept: 6/14/2019 Vimal Gentile MD OR same specialty: 6/14/2019 Vimal Gentile MD  Last physical: Visit date not found Last MTM visit: Visit date not found   Next visit within 3 mo: Visit date not found  Next physical within 3 mo: Visit date not found  Prescriber OR PCP: Vimal Gentile MD  Last diagnosis associated with med order: 1. Anxiety  - desvenlafaxine succinate 25 mg Tb24  [Pharmacy Med Name: DESVENLAFAXINE ER SUCCINATE 25MG T]; TAKE 2 TABLETS BY MOUTH DAILY  Dispense: 30 tablet; Refill: 0    If protocol passes may refill for 12 months if within 3 months of last provider visit (or a total of 15 months).             Passed - Blood Pressure in last year     BP Readings from Last 1 Encounters:   06/17/19 92/70

## 2021-06-01 ENCOUNTER — AMBULATORY - HEALTHEAST (OUTPATIENT)
Dept: FAMILY MEDICINE | Facility: CLINIC | Age: 36
End: 2021-06-01

## 2021-06-01 ENCOUNTER — OFFICE VISIT - HEALTHEAST (OUTPATIENT)
Dept: FAMILY MEDICINE | Facility: CLINIC | Age: 36
End: 2021-06-01

## 2021-06-01 VITALS — HEIGHT: 70 IN | WEIGHT: 168 LBS | BODY MASS INDEX: 24.05 KG/M2

## 2021-06-01 VITALS — BODY MASS INDEX: 23.34 KG/M2 | HEIGHT: 70 IN | WEIGHT: 163 LBS

## 2021-06-01 DIAGNOSIS — R53.83 FATIGUE, UNSPECIFIED TYPE: ICD-10-CM

## 2021-06-01 DIAGNOSIS — J02.9 ACUTE PHARYNGITIS, UNSPECIFIED ETIOLOGY: ICD-10-CM
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
## 2021-06-02 ENCOUNTER — COMMUNICATION - HEALTHEAST (OUTPATIENT)
Dept: INTERNAL MEDICINE | Facility: CLINIC | Age: 36
End: 2021-06-02

## 2021-06-02 VITALS — WEIGHT: 162 LBS | BODY MASS INDEX: 23.08 KG/M2

## 2021-06-02 LAB
SARS-COV-2 PCR COMMENT: NORMAL
SARS-COV-2 RNA SPEC QL NAA+PROBE: NEGATIVE
SARS-COV-2 VIRUS SPECIMEN SOURCE: NORMAL

## 2021-06-02 NOTE — PROGRESS NOTES
Contacted patient at number on file in Epic, [phone], and left message with encouragement to call with any questions regarding scheduling follow up, and wished her well.      Sharlene Mitchell, LULUSW

## 2021-06-03 ENCOUNTER — COMMUNICATION - HEALTHEAST (OUTPATIENT)
Dept: SCHEDULING | Facility: CLINIC | Age: 36
End: 2021-06-03

## 2021-06-03 VITALS — HEIGHT: 71 IN | BODY MASS INDEX: 21.14 KG/M2 | WEIGHT: 151 LBS

## 2021-06-03 VITALS
SYSTOLIC BLOOD PRESSURE: 101 MMHG | BODY MASS INDEX: 21.35 KG/M2 | TEMPERATURE: 99.3 F | HEART RATE: 109 BPM | DIASTOLIC BLOOD PRESSURE: 68 MMHG | WEIGHT: 153.1 LBS | OXYGEN SATURATION: 100 %

## 2021-06-03 VITALS — HEIGHT: 71 IN | BODY MASS INDEX: 21.28 KG/M2 | WEIGHT: 152 LBS
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
## 2021-06-06 NOTE — PROGRESS NOTES
"Mental Health Visit Note    2/27/2020    Start time: 10:13 AM    Stop Time: 11:05   Session # 1    Session Type: Patient is presenting for an Individual session.    Deanne Rico is a 35 y.o. female is being seen today for    Chief Complaint   Patient presents with      Follow Up     Anxiety     relationship issues, job stress     Depression     fatigue   .     New symptoms or complaints: Patient was last seen on 9/5/19 for a Diagnositic Assessment.  She indicated she has been very busy with work and family issues.  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      Functional Impairment:   Personal: 3  Family: 3  Work: 3  Social:2    Clinical assessment of mental status: Deanne presented on time for her appointment.  She was oriented x3, open and cooperative, and dressed appropriately for this session and weather. Her memory was normal cognitive functioning. Her speech was within normal limits. Language was appropriate to discussion. Concentration and focus is WNL. Psychosis is not noted, nor reported. Her mood is mildly dysphoric. Affect is stable and mood-congruent.  Fund of knowledge is adequate. Insight is adequate for therapy.    Suicidal/Homicidal Ideation present: None Reported This Session    Patient's impression of their current status: Patient stated, \"A lot has happened since last September. My  and I both ended up starting new jobs on October 14th. We ran out of money. 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 It's brought on a grieving. We both feel really compelled to not quit the music stuff,\" as she elaborated on the adjustments of new jobs and efforts to maintain active in music careers.  She discussed other sources of stress, including beginning to home school one of her daughters who has been " struggling with anxiety.  She considered the cumulative impact of stress on her marriage and reflected on a recent conflict she had with her .  She reflected on the values that are most meaningful to her (health, well-being, relationship, family, marriage, rewarding work, stability, creativity) and the behaviors that serve them.    Therapist impression of patients current state: Patient is beginning to engage in therapy today, last seen for an intake appointment on 9/5/19.  Her thoughts, feelings and beliefs were processed.  She is willing to explore CBT and ACT therapeutic frameworks for addressing symptoms.  She is also receptive to examining communication issues in and building healthy boundaries in relationship to further enhance her well-being.  She is a very thoughtful person.    Type of psychotherapeutic technique provided: Insight oriented, Client centered, Solution-focused, CBT and ACT    Progress toward short term goals:Progress as expected, therapeutic rapport building in progress.  Patient is receptive to therapeutic strategies for reducing symptoms.    Review of long term goals: Treatment Plan completed.    Diagnosis:   1. Major depressive disorder, recurrent episode, mild (H)    2. Generalized anxiety disorder        Plan and Follow up: Patient plans to nurture healthy routines.  She noted she is making efforts to nurture a mindfulness practice.  She plans to practice noticing her thoughts and feelings with a spirit of curiosity and acceptance, and apply ACT defusion techniques in the presence of aversive thoughts and/or feelings.  She plans to do a couple CBT thought exercises to help develop greater awareness to problematic thinking patterns.  Plans to practice healthy boundaries in her relationship, including assertive communication.  Encouraged to return for follow up in 1-2 weeks.        Discharge Criteria/Planning: Patient will continue with follow-up until therapy can be discontinued  without return of signs and symptoms.    Sharlene Mitchell 2/27/2020

## 2021-06-06 NOTE — TELEPHONE ENCOUNTER
RN cannot approve Refill Request    RN can NOT refill this medication Protocol failed and NO refill given. Last office visit: 6/14/2019 Vimal Gentile MD Last Physical: Visit date not found Last MTM visit: Visit date not found Last visit same specialty: 6/14/2019 Vimal Gentile MD.  Next visit within 3 mo: Visit date not found  Next physical within 3 mo: Visit date not found      Donya Kellogg, [facility] Connection Triage/Med Refill 3/17/2020    Requested Prescriptions   Pending Prescriptions Disp Refills     desvenlafaxine succinate (PRISTIQ) 50 MG 24 hr tablet [Pharmacy Med Name: DESVENLAFAXINE ER SUCCINATE 50MG T] 30 tablet 5     Sig: TAKE 1 TABLET BY MOUTH DAILY       Venlafaxine/Desvenlafaxine Refill Protocol Failed - 3/16/2020  3:52 AM        Failed - LFT or AST or ALT in last year     Albumin   Date Value Ref Range Status   06/24/2011 4.4 3.5 - 5.0 g/dL Final     Bilirubin, Total   Date Value Ref Range Status   06/24/2011 0.6 <1.1 mg/dL Final     Alkaline Phosphatase   Date Value Ref Range Status   06/24/2011 82 45 - 120 IU/L Final     AST   Date Value Ref Range Status   06/24/2011 11 <41 IU/L Final     ALT   Date Value Ref Range Status   06/24/2011 11 <46 IU/L Final     Protein, Total   Date Value Ref Range Status   06/24/2011 7.3 6.0 - 8.0 g/dL Final                Failed - Fasting lipid cascade in last year     Patient Fasting > 8hrs?   Date Value Ref Range Status   07/18/2016 Yes  Final             Passed - PCP or prescribing provider visit in last year     Last office visit with prescriber/PCP: 6/14/2019 Vimal Gentile MD OR same dept: 6/14/2019 Vimal Gentile MD OR same specialty: 6/14/2019 Vimal Gentile MD  Last physical: Visit date not found Last MTM visit: Visit date not found   Next visit within 3 mo: Visit date not found  Next physical within 3 mo: Visit date not found  Prescriber OR PCP: Vimal Gentile MD  Last diagnosis associated with med order: 1. Anxiety  - desvenlafaxine  succinate (PRISTIQ) 50 MG 24 hr tablet [Pharmacy Med Name: DESVENLAFAXINE ER SUCCINATE 50MG T]; TAKE 1 TABLET BY MOUTH DAILY  Dispense: 30 tablet; Refill: 5    If protocol passes may refill for 12 months if within 3 months of last provider visit (or a total of 15 months).             Passed - Blood Pressure in last year     BP Readings from Last 1 Encounters:   11/11/19 101/68

## 2021-06-08 ENCOUNTER — RECORDS - HEALTHEAST (OUTPATIENT)
Dept: INTERNAL MEDICINE | Facility: CLINIC | Age: 36
End: 2021-06-08

## 2021-06-08 NOTE — PROGRESS NOTES
Assessment:      31 y.o., routine Paragard IUD check -  6 weeks post-insertion, correct placement confirmed.        Plan:     1.  Reassurance re: correct placement, normalcy of side effects and that these often lessen with extended use of IUD.   2.  Recommended taking ibuprofen 600 mg [id number] x 5 days to reduce amount and duration of bleeding.    3.  Taught and encouraged to check IUD strings monthly.    4.  Warning signs related to IUD use (PAINS) and when to call CNM reviewed.  5. Follow up with mental health provider and continue existing plan of care      Subjective:      Deanne Rico is a 31 y.o. female who presents for IUD check. This was inserted on 12/5/16. The patient has no complaints today but did feel concerned that her partner could feel the strings.  He did not mention this the last time they had intercourse.  The patient is sexually active. She has checked her IUD strings.    Pt. Is here alone today.  She reports personal stressors including the city is buying their home and they are relocating.  She states things have been a roller coaster but she and her  are working together to prioritize.  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  She has been seeing a counselor every few weeks. PHQ-9 is 3 today.        Review of Systems  Pertinent items are noted in HPI.     Objective:     Pelvic:SE only - normal vaginal and mucosal tissue.  No abnormal discharge or odor.  Cervix normal appearance with IUD strings visible x 2 without evidence of IUD itself.  Approximately 2 cm long.       TTSw/pt. 20 minutes.  >50% of time spent counseling.  ZULLY Contreras,LILLIANA

## 2021-06-09 ENCOUNTER — COMMUNICATION - HEALTHEAST (OUTPATIENT)
Dept: MIDWIFE SERVICES | Facility: CLINIC | Age: 36
End: 2021-06-09

## 2021-06-11 ENCOUNTER — RECORDS - HEALTHEAST (OUTPATIENT)
Dept: ADMINISTRATIVE | Facility: OTHER | Age: 36
End: 2021-06-11

## 2021-06-11 DIAGNOSIS — F41.9 ANXIETY: ICD-10-CM

## 2021-06-11 RX ORDER — DESVENLAFAXINE 50 MG/1
50 TABLET, FILM COATED, EXTENDED RELEASE ORAL DAILY
Qty: 30 TABLET | Refills: 12 | Status: SHIPPED | OUTPATIENT
Start: 2021-06-11 | End: 2022-05-27

## 2021-06-11 NOTE — TELEPHONE ENCOUNTER
RN Triage:   Fever started yesterday 100   Body aches.   Low back pain  Advised [email or web address] to speak with a provider.   Home cares reviewed with patient.   Dee Randhawa RN, BSN Care Connection Triage Nurse    COVID 19 Nurse Triage Plan/Patient Instructions    Please be aware that novel coronavirus (COVID-19) may be circulating in the community. If you develop symptoms such as fever, cough, or SOB or if you have concerns about the presence of another infection including coronavirus (COVID-19), please contact your health care provider or visit [email or web address].     Disposition/Instructions    Additional COVID19 information to add for patients.   How can I protect others?  If you have symptoms (fever, cough, body aches or trouble breathing): Stay home and away from others (self-isolate) until:    At least 10 days have passed since your symptoms started. And     You ve had no fever--and no medicine that reduces fever--for 1 full day (24 hours). And      Your other symptoms have resolved (gotten better).     If you don t have symptoms, but a test showed that you have COVID-19 (you tested positive):    Stay home and away from others (self-isolate) until at least 10 days have passed since the date of your first positive COVID-19 test.    During this time:    Stay in your own room, even for meals. Use your own bathroom if you can.     Stay away from others in your home. No hugging, kissing or shaking hands. No visitors.    Don t go to work, school or anywhere else.     Clean  high touch  surfaces often (doorknobs, counters, handles, etc.). Use a household cleaning spray or wipes. You ll find a full list on the EPA website:  www.epa.gov/pesticide-registration/list-n-disinfectants-use-against-sars-cov-2.    Cover your mouth and nose with a mask, tissue or washcloth to avoid spreading germs.    Wash your hands and face often. Use soap and water.    Caregivers in these groups are at risk for severe illness due to  COVID-19:  o People 65 years and older  o People who live in a nursing home or long-term care facility  o People with chronic disease (lung, heart, cancer, diabetes, kidney, liver, immunologic)  o People who have a weakened immune system, including those who:  - Are in cancer treatment  - Take medicine that weakens the immune system, such as corticosteroids  - Had a bone marrow or organ transplant  - Have an immune deficiency  - Have poorly controlled HIV or AIDS  - Are obese (body mass index of 40 or higher)  - Smoke regularly    Caregivers should wear gloves while washing dishes, handling laundry and cleaning bedrooms and bathrooms.    Use caution when washing and drying laundry: Don t shake dirty laundry, and use the warmest water setting that you can.    For more tips, go to www.cdc.gov/coronavirus/2019-ncov/downloads/10Things.pdf.    How can I take care of myself?  1. Get lots of rest. Drink extra fluids (unless a doctor has told you not to).     2. Take Tylenol (acetaminophen) for fever or pain. If you have liver or kidney problems, ask your family doctor if it s okay to take Tylenol.     Adults can take either:     650 mg (two 325 mg pills) every 4 to 6 hours, or     1,000 mg (two 500 mg pills) every 8 hours as needed.     Note: Don t take more than 3,000 mg in one day.   Acetaminophen is found in many medicines (both prescribed and over-the-counter medicines). Read all labels to be sure you don t take too much.     For children, check the Tylenol bottle for the right dose. The dose is based on the child s age or weight.    3. If you have other health problems (like cancer, heart failure, an organ transplant or severe kidney disease): Call your specialty clinic if you don t feel better in the next 2 days.    4. Know when to call 911: Emergency warning signs include:    Trouble breathing or shortness of breath    Pain or pressure in the chest that doesn t go away    Feeling confused like you haven t felt  before, or not being able to wake up    Bluish-colored lips or face    What are the symptoms of COVID-19?     The most common symptoms are cough, fever and trouble breathing.     Less common symptoms include body aches, chills, diarrhea (loose, watery poops), fatigue (feeling very tired), headache, runny nose, sore throat and loss of smell.    COVID-19 can cause severe coughing (bronchitis) and lung infection (pneumonia).    How does it spread?     The virus may spread when a person coughs or sneezes into the air. The virus can travel about 6 feet this way, and it can live on surfaces.      Common  (household disinfectants) will kill the virus.    Who is at risk?  Anyone can catch COVID-19 if they re around someone who has the virus.    How can others protect themselves?     Stay away from people who have COVID-19 (or symptoms of COVID-19).    Wash hands often with soap and water. Or, use hand  with at least 60% alcohol.    Avoid touching the eyes, nose or mouth.     Wear a face mask when you go out in public, when sick or when caring for a sick person.    Where can I get more information?    [facility]: About COVID-19: [email or web address]    CDC: What to Do If You re Sick: www.cdc.gov/coronavirus/2019-ncov/about/steps-when-sick.html    CDC: Ending Home Isolation: www.cdc.gov/coronavirus/2019-ncov/hcp/disposition-in-home-patients.html     CDC: Caring for Someone: www.cdc.gov/coronavirus/2019-ncov/if-you-are-sick/care-for-someone.html    [facility]: Interim Guidance for Hospital Discharge to Home: www.health.[facility].[place]/diseases/coronavirus/hcp/hospdischarge.pdf    [facility] clinical trials (COVID-19 research studies): clinicalaffairs.[facility]/umn-clinical-trials     Below are the COVID-19 hotlines at the [place] Department of Health ([facility]). Interpreters are available.   o For health questions: Call 183-572-2414 or 1-651.700.7402 (7 a.m. to 7 p.m.)  o For questions about  schools and childcare: Call 393-133-5581 or 1-942.778.5759 (7 a.m. to 7 p.m.)            Thank you for taking steps to prevent the spread of this virus.  o Limit your contact with others.  o Wear a simple mask to cover your cough.  o Wash your hands well and often.    Resources     [facility]: About COVID-19: [email or web address]    CDC: What to Do If You're Sick: www.cdc.gov/coronavirus/2019-ncov/about/steps-when-sick.html    CDC: Ending Home Isolation: www.cdc.gov/coronavirus/2019-ncov/hcp/disposition-in-home-patients.html     CDC: Caring for Someone: www.cdc.gov/coronavirus/2019-ncov/if-you-are-sick/care-for-someone.html     [facility]: Interim Guidance for Hospital Discharge to Home: www.[facility].mn./diseases/coronavirus/hcp/hospdischarge.pdf    [facility] clinical trials (COVID-19 research studies): clinicalaffairs.[facility]-clinical-trials     Below are the COVID-19 hotlines at the [place] Department of [facility] ([facility]). Interpreters are available.   o For health questions: Call 831-237-4860 or 1-447.811.9030 (7 a.m. to 7 p.m.)  o For questions about schools and childcare: Call 850-735-4772 or 1-433.782.9058 (7 a.m. to 7 p.m.)         COVID 19 Nurse Triage Plan/Patient Instructions    Please be aware that novel coronavirus (COVID-19) may be circulating in the community. If you develop symptoms such as fever, cough, or SOB or if you have concerns about the presence of another infection including coronavirus (COVID-19), please contact your health care provider or visit [email or web address].     Disposition/Instructions    Virtual Visit with provider recommended. Reference Visit Selection Guide.    Thank you for taking steps to prevent the spread of this virus.  o Limit your contact with others.  o Wear a simple mask to cover your cough.  o Wash your hands well and often.    Resources     [facility]: About COVID-19: [email or web address]    CDC: What to Do If You're Sick:  www.cdc.gov/coronavirus/2019-ncov/about/steps-when-sick.html    CDC: Ending Home Isolation: www.cdc.gov/coronavirus/2019-ncov/hcp/disposition-in-home-patients.html     CDC: Caring for Someone: www.cdc.gov/coronavirus/2019-ncov/if-you-are-sick/care-for-someone.html     [facility]: Interim Guidance for Hospital Discharge to Home: www.health.[facility].mn./diseases/coronavirus/hcp/hospdischarge.pdf    [facility] clinical trials (COVID-19 research studies): clinicalaffairs.[facility]-clinical-trials     Below are the COVID-19 hotlines at the [place] Department of Health ([facility]). Interpreters are available.   o For health questions: Call 126-088-4643 or 1-699.604.7961 (7 a.m. to 7 p.m.)  o For questions about schools and childcare: Call 536-070-2894 or 1-802.378.4732 (7 a.m. to 7 p.m.)         Reason for Disposition    [1] COVID-19 infection suspected by caller or triager AND [2] mild symptoms (cough, fever, or others) AND [3] no complications or SOB    Additional Information    Negative: [1] COVID-19 exposure AND [2] no symptoms    Negative: COVID-19 and Breastfeeding, questions about    Negative: [1] Adult with possible COVID-19 symptoms AND [2] triager concerned about severity of symptoms or other causes    Negative: SEVERE difficulty breathing (e.g., struggling for each breath, speaks in single words)    Negative: Difficult to awaken or acting confused (e.g., disoriented, slurred speech)    Negative: Bluish (or gray) lips or face now    Negative: Shock suspected (e.g., cold/pale/clammy skin, too weak to stand, low BP, rapid pulse)    Negative: Sounds like a life-threatening emergency to the triager    Negative: SEVERE or constant chest pain or pressure (Exception: mild central chest pain, present only when coughing)    Negative: MODERATE difficulty breathing (e.g., speaks in phrases, SOB even at rest, pulse 100-120)    Negative: Patient sounds very sick or weak to the triager    Negative: MILD difficulty  breathing (e.g., minimal/no SOB at rest, SOB with walking, pulse <100)    Negative: Chest pain or pressure    Negative: Fever > 103 F (39.4 C)    Negative: [1] Fever > 101 F (38.3 C) AND [2] age > 60    Negative: [1] Fever > 100.0 F (37.8 C) AND [2] bedridden (e.g., nursing home patient, CVA, chronic illness, recovering from surgery)    Negative: HIGH RISK patient (e.g., age > 64 years, diabetes, heart or lung disease, weak immune system) (Exception: Has already been evaluated by healthcare provider and has no new or worsening symptoms)    Negative: Fever present > 3 days (72 hours)    Negative: [1] Fever returns after gone for over 24 hours AND [2] symptoms worse or not improved    Negative: [1] Continuous (nonstop) coughing interferes with work or school AND [2] no improvement using cough treatment per protocol    Protocols used: CORONAVIRUS (COVID-19) DIAGNOSED OR [id number] 8.4.20

## 2021-06-12 NOTE — TELEPHONE ENCOUNTER
Medication Question or Clarification  Who is calling: incoming fax from pharmacy  What medication are you calling about (include dose and sig)?: desvenlafaxine succinate (PRISTIQ) 50 MG 24 hr tablet  Who prescribed the medication?: Vimal Gentile MD    What is your question/concern?: insurance plane is requesting alternatives  Bupropion, bupropion XL, bupropion HCLSR, Mirtazapine, NefazodoneHCL, Venlafaxine HCL  [facility]ed [facility]acy: [facility] to leave a detailed message?: Yes

## 2021-06-12 NOTE — TELEPHONE ENCOUNTER
Controlled Substance Refill Request  Medication Name:   Requested Prescriptions     Pending Prescriptions Disp Refills     desvenlafaxine succinate (PRISTIQ) 50 MG 24 hr tablet [Pharmacy Med Name: DESVENLAFAXINE ER SUCCINATE 50MG T] 30 tablet 5     Sig: TAKE 1 TABLET BY MOUTH DAILY     Date Last Fill: 3/17/20, last OV   [facility]ed [facility]acy: [facility]  Submit electronically to pharmacy  Controlled Substance Agreement on file:   Encounter-Level CSA Scan Date:    There are no encounter-level csa scan date.        Last office visit:  6/17/19  Cherelle Thomas RN, MA  [facility]    Triage Nurse Advisor

## 2021-06-12 NOTE — TELEPHONE ENCOUNTER
Central PA team  [phone]  Pool: HE PA MED ([id number])          PA has been initiated.       PA form completed and faxed insurance via Cover My Meds     Key:   [id number] - PA Case ID: [id number]     Medication:  Desvenlafaxine Succinate ER 50MG er tablets    Insurance:  [facility]        Response will be received via fax and may take up to 5-10 business days depending on plan

## 2021-06-12 NOTE — TELEPHONE ENCOUNTER
Prior Authorization Request  Who s requesting:  Patient  [facility]  Medication Name: desvenlafaxine succinate (PRISTIQ) 50 MG 24 hr tablet  Insurance Plan: [employer]  Insurance Member ID Number:  [id number]  CoverMyMeds Key: N/A  Informed patient that prior authorizations can take up to 10 business days for response:   Yes  Okay to leave a detailed message: Yes

## 2021-06-12 NOTE — TELEPHONE ENCOUNTER
PRIOR AUTHORIZATION DENIED    Denial Rational: Patient needs to try and fail two of the following medications:  BUPROPION HCL,BUPROPION XL,BUPROPION HCL SR,MIRTAZAPINE,NEFAZODONE HCL,VENLAFAXINE HCL ER,VENLAFAXINE HC      Appeal Information: If provider would like to appeal please provide a letter of medical necessity stating why formulary alternatives would not be clinically appropriate for the patient and route back to the PA team.

## 2021-06-16 NOTE — PROGRESS NOTES
"Annual Health Maintenance Exam    Subjective:     Deanne is a 33 y.o. female who presents for an annual exam.  She is an established patient to the [facility]. She presents today with her son \"Delon\" who is 16 months old.  He is breastfeeding approximately 5-7 times/day, through the night. They currently share a family bed with their four children, and she does nurse through the night.  She is currently \"unschooling\" her children. She and her  Farrukh bought a house one year ago, which she reports feels very good to have stable housing. 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  Farrukh is working full time at their [employer] as well, as an .   Deanne has remained healthy throughout this past year, with the exception of a low back injury that occurred in December. She reports it was diagnosed as a bulging disc and was treated by chiropractic care as well as exercise/. 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  She is currently using Lexapro for anxiety/depression diagnosis. She feels this is stable and she denies any concerns with side effects.  She is not currently in therapy.      Allergies  Review of patient's allergies indicates no known allergies.    Current Medications  Current Outpatient Prescriptions   Medication Sig Dispense Refill     copper (PARAGARD T 380A) 380 square mm IUD IUD 1 each by Intrauterine route once.       escitalopram oxalate (LEXAPRO) 10 MG tablet TAKE 1 TABLET BY MOUTH DAILY 90 tablet 0     prenatal multivit-Ca-min-Fe-FA (PRENATAL VITAMIN) Tab Take 1 tablet by mouth daily.       No current facility-administered medications for this visit.        Past Medical/Surgical History  Past Medical History:   Diagnosis Date     Anemia      Postpartum depression     postpartum depression only. On Lexapro 10 mg currently.     " Trauma     sep symphsis pubis from preg, no traum/viol     Varicella     as a child     Varicosities      Past Surgical History:   Procedure Laterality Date     WISDOM TOOTH EXTRACTION      14 y/o       Obstetric and Gynecologic History  Patient's last menstrual period was 2018.    OB History    Para Term  AB Living   4 4 4 0 0 4   SAB TAB Ectopic Multiple Live Births   0 0 0 0 4      # Outcome Date GA Lbr Pantera/2nd Weight Sex Delivery Anes PTL Lv   4 Term 10/10/16 40w2d  8 lb 5 oz (3.771 kg) M Vag-Spont  N MARTINEZ   3 Term 06/10/14 40w1d 07:45 / 00:05 7 lb 3 oz (3.26 kg) M Water Birth None N MARTINEZ   2 Term 11   8 lb 3 oz (3.714 kg) F Vag-Spont None N MARTINEZ   1 Term 08   8 lb 3 oz (3.714 kg) F Vag-Spont None N MARTINEZ          Last Pap: 13.  Results were: normal  Last mammogram: NA.    Immunization History  Status updated.    Family History  Family History   Problem Relation Age of Onset     No Medical Problems Mother      Hypertension Father      Arthritis Father      No Medical Problems Sister      No Medical Problems Brother      Breast cancer Maternal Aunt      Cancer Paternal Aunt      Arthritis Maternal Grandmother      Dementia Maternal Grandfather      No Medical Problems Paternal Grandmother      Kidney disease Paternal Grandfather        Health Maintenance  Diet:  regular  Regular Exercise: Yes.    Caffeine use:  regular  Sunscreen Use: Yes.   Dental Visits Regularly: Yes  Seat Belt: Yes  Self Breast Exam Monthly:No  Abuse History:  Does not have a history of abuse.  Currently does feel safe in all her relationships.      Social History  Social History     Social History     Marital status:      Spouse name: N/A     Number of children: N/A     Years of education: college     Occupational History     Stay at home mom      Social History Main Topics     Smoking status: Never Smoker     Smokeless tobacco: Never Used     Alcohol use No     Drug use: No     Sexual activity: Yes     " Partners: Male     Birth control/ protection: IUD     Other Topics Concern     Not on file     Social History Narrative       Further Screening History  Colonoscopy: NA.  Lipid Profile: NA .  Glucose Screen: pregnancy (WNL).  Last Dexa: NA.    Review of Systems  A 12 point comprehensive review of systems was negative except as noted.    Objective:         Vitals:    02/26/18 1125   BP: 90/60   Pulse: 72   Weight: 168 lb (76.2 kg)   Height: 5' 10.25\" (1.784 m)       Physical Exam:  General: Pleasant, articulate, well-groomed, well-nourished female.  Not in any apparent distress.  Neck: Supple.  Thyroid: Small, symmetrical, no nodules noted.  Lymphadenopathy: Negative.  Lungs: Clear to auscultation bilaterally, and a nonlabored breathing pattern.  Cardio: Regular rate and rhythm, negative for murmur.   Breasts: Symmetrical, nontender, no masses, negative lymphadenopathy, negative nipple discharge. Active lactation noted.  Abdomen: Soft, nontender, no masses, negative CVAT.  External genitalia: Normal hair distribution, no lesions.  Urethral opening: Without lesions, or tenderness.   Bladder: Without masses, or tenderness.  Vagina: Pink, rugated, normal-appearing discharge.  Cervix: posterior, pink, smooth, no lesions.  Pap smear was obtained.  Bimanual: Finds uterus small anteverted, mobile, nontender, no masses.  Negative CMT.  Adnexa: without masses or tenderness.    Lower extremities: +1 reflexes, no significant edema.    Assessment / Plan:     1) Annual health maintenance exam generally within normal limits today.  Pap smear performed today with reflex HPV.  2) Depression/anxiety- stable on meds  3) Low back pain due to herniated disc- improving  4) Paraguard IUD- appears to be in appropriate placement      1. Discussed nutrition and exercise.  Advised MVI, Vitamin D3 4000IU geltab and an omega 3 supplement daily.   Breast self exam technique reviewed and patient encouraged to perform self-exam monthly.  Next pap " due 2022. HPV co-testing discussed with that pap, then paps q 5 yrs if both negative.  2. Continue with Lexapro 10 mg daily. Continue to monitor s/sx anxiety/depression worsening or changing. Notify CNM if need for additional mental health resources.  3. Continue receiving care from chiro and  to help support strengthening of low back and decreasing pain.  4.Continue to check IUD monthly for placement.  5.  RTC 1 year for annual physical exam, or PRN.    TT = 40 minutes of time of which >50% on education/counseling/care-coordination

## 2021-06-17 NOTE — TELEPHONE ENCOUNTER
Prior Authorization Request  Who s requesting:  Pharmacy  Pharmacy Name and Location: [facility]  Medication Name: Desvenlafaxine succinate Er 50 mg  Insurance Plan:   Insurance Member ID Number:    CoverMyMeds Key: [id number]  Informed patient that prior authorizations can take up to 10 business days for response:   No  Okay to leave a detailed message: No

## 2021-06-17 NOTE — TELEPHONE ENCOUNTER
Central PA team  [phone]  Pool: HE PA MED ([id number])          PA has been initiated.       PA form completed and faxed insurance via Cover My Meds     Key:  [id number]     Medication:  desvenlafaxine succ ER 50mg     Insurance:  Blue Plus        Response will be received via fax and may take up to 5-10 business days depending on plan

## 2021-06-17 NOTE — TELEPHONE ENCOUNTER
Prior Authorization Request  Who s requesting:  Pharmacy  Pharmacy Name and Location: [address] [place] [address].   Medication Name: Desvenlafaxine ER Succinate 50 MG   Insurance Plan: John CANTU   Insurance Member ID Number:  [id number]  CoverMyMeds Key: N/A  Informed patient that prior authorizations can take up to 10 business days for response:   NA  Okay to leave a detailed message: No

## 2021-06-17 NOTE — TELEPHONE ENCOUNTER
Telephone Encounter by Tracie Mcknight at 5/5/2021 11:55 AM     Author: Tracie Mcknight Service: -- Author Type: --    Filed: 5/5/2021 11:55 AM Encounter Date: 4/27/2021 Status: Signed    : Tracie Mcknight APPROVED:    Approval start date: 2/4/2021  Approval end date:  5/4/2022    Pharmacy has been notified of approval and will contact patient when medication is ready for pickup.

## 2021-06-20 NOTE — PROGRESS NOTES
ASSESSMENT & PLAN    No problem-specific Assessment & Plan notes found for this encounter.      Diagnoses and all orders for this visit:    Screening for deficiency anemia  -     Vitamin D, Total (25-Hydroxy)  -     Vitamin B12  -     Iron and Transferrin Iron Binding Capacity  -     Ferritin    Adjustment reaction with mixed disturbance of emotions and conduct        No Follow-up on file.       Little interest or pleasure in doing things: Not at all  Feeling down, depressed, or hopeless: Not at all  Trouble falling or staying asleep, or sleeping too much: Several days  Feeling tired or having little energy: Several days  Poor appetite or overeating: Not at all  Feeling bad about yourself - or that you are a failure or have let yourself or your family down: Not at all  Trouble concentrating on things, such as reading the newspaper or watching television: Not at all  Moving or speaking so slowly that other people could have noticed. Or the opposite - being so fidgety or restless that you have been moving around a lot more than usual: Not at all  Thoughts that you would be better off dead, or of hurting yourself in some way: Not at all  PHQ-9 Total Score: 2  If you checked off any problems, how difficult have these problems made it for you to do your work, take care of things at home, or get along with other people?: Somewhat difficult    CHIEF COMPLAINT: Deanne Rico had no chief complaint listed for this encounter.    [place] IRA: 1.............. had no chief complaint listed for this encounter.    1. Screening for deficiency anemia    2. Adjustment reaction with mixed disturbance of emotions and conduct          CC:              Depression Cure    Exercise  Omega 3   Lena      Off meds cold turkey   Little rough      THerapist   Courtney Granados    Change Freelance Work       [employer]   Feel resilience   Close to Gun Violence     Chiro   Back [place]   L4 L5  [facility]   Layne Bettencourt    Adjustment every couple weeks  C[facility]   Getting Sunshine  Sleep   Any other Problems in order of Priority:        SUBJECTIVE:  Deanne Rico is a 33 y.o. female    Past Medical History:   Diagnosis Date     Anemia      Herniated lumbar disc without myelopathy 12/2017     Postpartum depression     postpartum depression only. On Lexapro 10 mg currently.     Trauma     sep symphsis pubis from preg, no traum/viol     Varicella     as a child     Varicosities      Past Surgical History:   Procedure Laterality Date     WISDOM TOOTH EXTRACTION      14 y/o     Review of patient's allergies indicates no known allergies.  Current Outpatient Prescriptions   Medication Sig Dispense Refill     cholecalciferol, vitamin D3, (VITAMIN D3 ORAL) Take by mouth.       copper (PARAGARD T 380A) 380 square mm IUD IUD 1 each by Intrauterine route once.       prenatal multivit-Ca-min-Fe-FA (PRENATAL VITAMIN) Tab Take 1 tablet by mouth daily.       escitalopram oxalate (LEXAPRO) 10 MG tablet TAKE 1 TABLET BY MOUTH DAILY 90 tablet 1     No current facility-administered medications for this visit.      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 She has never used smokeless tobacco. She reports that she does not drink alcohol or use illicit drugs.    REVIEW OF SYSTEMS:   Family history not pertinent to chief complaint or presenting problem    Review of Systems:     Nervous System: No headache, dizziness, fainting or memory loss. No tingling sensation of hand or feet.  Ears: No hearing loss or ringing in the ears  Eyes: No blurring of vision, redness, itching or dryness.  Nose: No nosebleed or loss of smell  Mouth: No mouth sores or loss of taste  Throat: No hoarseness or difficulty swallowing  Neck: No enlarged thyroid or lymph nodes.  Heart: No chest pain, palpitation or irregular heartbeat. No swelling of hands or feet  Lungs: No shortness of breath, cough, night sweats, wheezing or hemoptysis.  Gastrointestinal: No nausea or vomiting, constipation or diarrhea. No acid reflux, abdominal pain or blood in stools.  Kidney/Bladdr: No polyuria, polydipsia, nocturia or hematuria.  Genital/Sexual: No loss of libido No Sex function Changes  Skin: No rash, hair loss or hirsutism.   Muscles/Joints/Bones: No morning stiffness, muscle aches and pain or loss of height.      Review of systems otherwise negative as requested from patient, except   Those positive ROS outlined and discussed in [place].    OBJECTIVE:  /70  Pulse 71  Ht 5' 10.25\" (1.784 m)  Wt 163 lb (73.9 kg)  SpO2 98%  BMI 23.22 kg/m2    GENERAL:     No acute distress.   Alert and oriented X 3         Physical:    Full affect  No thyromegaly  Regular rhythm  No tremor        ASSESSMENT & PLAN      Diagnoses and all orders for this visit:    Screening for deficiency anemia  -     Vitamin D, Total (25-Hydroxy)  -     Vitamin B12  -     Iron and Transferrin Iron Binding Capacity  -     " Ferritin    Adjustment reaction with mixed disturbance of emotions and conduct    Improved mood  More stable  Life style interventions discussed and keeping balance  Check for nutritional deficiencies    No Follow-up on file.       Anticipatory Guidance and Symptomatic Cares Discussed   Advised to call back directly if there are further questions, or if these symptoms fail to improve as anticipated or worsen.  Return to clinic if patient has a clinical concern that warrants an exam.         I spent 20 minutes with this patient face to face, of which 50% or greater was spent in counseling and coordination of care with regards to Diagnoses and all orders for this visit:    Screening for deficiency anemia  -     Vitamin D, Total (25-Hydroxy)  -     Vitamin B12  -     Iron and Transferrin Iron Binding Capacity  -     Ferritin    Adjustment reaction with mixed disturbance of emotions and conduct        Vimal Gentile MD  [facility] [address] [phone]

## 2021-06-25 NOTE — PROGRESS NOTES
Deanne Rico is a 36 y.o. female who is being evaluated via a billable telephone visit.      What phone number would you like to be contacted at? [phone]  How would you like to obtain your AVS? AVS Preference: MyChart.    Assessment & Plan     Acute pharyngitis, unspecified etiology  Fatigue, unspecified type  - Symptomatic COVID-19 Virus (CORONAVIRUS) PCR  - SARS-CoV-2 (COVID-19)-PCR  Discussed possible etiology including viral, bacterial, allergic.   Will routine out covid 19.   Supportive care discussed.   Would consider strep testing if needed  Follow up as needed for this.   Return for Annual physical.    Bianca Tejada MD  [facility]   Deanne Rico is 36 y.o. female with concerns of sore throat, feeling very tired.   She notes her sons have the same symptoms, this started last Friday for them. They both have much nasal congestion, no cough, no fever. She has some pain on swallowing today. No cough. No trouble breathing. No loss of taste or smell. Has normal urine and stool. She would jose to rule out covid 19 due to the pandemic and so her son can return to school.       Objective       Vitals:  No vitals were obtained today due to virtual visit.              Phone call duration: 6 minutes

## 2021-06-25 NOTE — TELEPHONE ENCOUNTER
RN cannot approve Refill Request    RN can NOT refill this medication PCP messaged that patient is overdue for Labs, Office Visit and Blood Pressure Check. . Last office visit: 6/14/2019 Vimal Gentile MD Last Physical: Visit date not found Last MTM visit: Visit date not found Last visit same specialty: Visit date not found.  Next visit within 3 mo: Visit date not found  Next physical within 3 mo: Visit date not found      Randi Bamuan, [facility] Triage/Med Refill 6/8/2021    Requested Prescriptions   Pending Prescriptions Disp Refills     desvenlafaxine succinate (PRISTIQ) 50 MG 24 hr tablet [Pharmacy Med Name: DESVENLAFAXINE SUCC ER 50MG] 30 tablet 0     Sig: TAKE ONE TABLET BY MOUTH EVERY DAY       Venlafaxine/Desvenlafaxine Refill Protocol Failed - 6/8/2021  7:45 AM        Failed - LFT or AST or ALT in last year     Albumin   Date Value Ref Range Status   06/24/2011 4.4 3.5 - 5.0 g/dL Final     Bilirubin, Total   Date Value Ref Range Status   06/24/2011 0.6 <1.1 mg/dL Final     Alkaline Phosphatase   Date Value Ref Range Status   06/24/2011 82 45 - 120 IU/L Final     AST   Date Value Ref Range Status   06/24/2011 11 <41 IU/L Final     ALT   Date Value Ref Range Status   06/24/2011 11 <46 IU/L Final     Protein, Total   Date Value Ref Range Status   06/24/2011 7.3 6.0 - 8.0 g/dL Final                Failed - Fasting lipid cascade in last year     Patient Fasting > 8hrs?   Date Value Ref Range Status   07/18/2016 Yes  Final             Failed - PCP or prescribing provider visit in last year     Last office visit with prescriber/PCP: 6/14/2019 Vimal Gentile MD OR same dept: Visit date not found OR same specialty: Visit date not found  Last physical: Visit date not found Last MTM visit: Visit date not found   Next visit within 3 mo: Visit date not found  Next physical within 3 mo: Visit date not found  Prescriber OR PCP: Vimal Gentile MD  Last diagnosis associated with med order: 1. Anxiety  -  desvenlafaxine succinate (PRISTIQ) 50 MG 24 hr tablet [Pharmacy Med Name: DESVENLAFAXINE SUCC ER 50MG]; TAKE ONE TABLET BY MOUTH EVERY DAY  Dispense: 30 tablet; Refill: 0    If protocol passes may refill for 12 months if within 3 months of last provider visit (or a total of 15 months).             Failed - Blood Pressure in last year     BP Readings from Last 1 Encounters:   11/11/19 101/68

## 2021-06-25 NOTE — TELEPHONE ENCOUNTER
Left message to call back for: Deanne  Information to relay to patient:  What are her questions?

## 2021-06-25 NOTE — TELEPHONE ENCOUNTER
Reason for Call:  Other call back      Detailed comments: Pt received her Covid Test results and has further questions    Please advise    Phone Number Patient can be reached at:   Cell number on file:    Telephone Information:   Mobile [phone]       Best Time: anytime    Can we leave a detailed message on this number?: Yes    Call taken on 6/2/2021 at 8:21 AM by Luana Green

## 2021-06-25 NOTE — TELEPHONE ENCOUNTER
RN cannot approve Refill Request    RN can NOT refill this medication PCP messaged that patient is overdue for Labs. Last office visit: 6/14/2019 Vimal Gentile MD Last Physical: Visit date not found Last MTM visit: Visit date not found Last visit same specialty: 6/14/2019 Vimal Gentile MD.  Next visit within 3 mo: Visit date not found  Next physical within 3 mo: Visit date not found      Cherelle Thomas, [facility] Triage/Med Refill 6/11/2021    Requested Prescriptions   Pending Prescriptions Disp Refills     desvenlafaxine succinate (PRISTIQ) 50 MG 24 hr tablet 30 tablet 0     Sig: Take 1 tablet (50 mg total) by mouth daily.       Venlafaxine/Desvenlafaxine Refill Protocol Failed - 6/10/2021  3:07 PM        Failed - LFT or AST or ALT in last year     Albumin   Date Value Ref Range Status   06/24/2011 4.4 3.5 - 5.0 g/dL Final     Bilirubin, Total   Date Value Ref Range Status   06/24/2011 0.6 <1.1 mg/dL Final     Alkaline Phosphatase   Date Value Ref Range Status   06/24/2011 82 45 - 120 IU/L Final     AST   Date Value Ref Range Status   06/24/2011 11 <41 IU/L Final     ALT   Date Value Ref Range Status   06/24/2011 11 <46 IU/L Final     Protein, Total   Date Value Ref Range Status   06/24/2011 7.3 6.0 - 8.0 g/dL Final                Failed - Fasting lipid cascade in last year     Patient Fasting > 8hrs?   Date Value Ref Range Status   07/18/2016 Yes  Final             Failed - PCP or prescribing provider visit in last year     Last office visit with prescriber/PCP: 6/14/2019 Vimal Gentile MD OR same dept: Visit date not found OR same specialty: 6/14/2019 Vimal Gentile MD  Last physical: Visit date not found Last MTM visit: Visit date not found   Next visit within 3 mo: Visit date not found  Next physical within 3 mo: Visit date not found  Prescriber OR PCP: Vimal Gentile MD  Last diagnosis associated with med order: 1. Anxiety  - desvenlafaxine succinate (PRISTIQ) 50 MG 24 hr tablet; Take 1  tablet (50 mg total) by mouth daily.  Dispense: 30 tablet; Refill: 0    If protocol passes may refill for 12 months if within 3 months of last provider visit (or a total of 15 months).             Failed - Blood Pressure in last year     BP Readings from Last 1 Encounters:   11/11/19 101/68

## 2021-06-27 NOTE — PROGRESS NOTES
Progress Notes by Paloma Paz CNP at 2018 10:30 AM     Author: Paloma Paz CNP Service: -- Author Type: Nurse Practitioner    Filed: 2019  2:40 PM Encounter Date: 2018 Status: Signed    : Paloma Paz CNP (Nurse Practitioner)       Chief Complaint   Patient presents with   ? Sore Throat     x 1 week       ASSESSMENT & PLAN:   Diagnoses and all orders for this visit:    Streptococcal sore throat  -     Rapid Strep A Screen-Throat  -     amoxicillin (AMOXIL) 500 MG tablet  Dispense: 20 tablet; Refill: 0        MDM:  Discussed throwing away toothbrush after 24 hours.  No work/school for at least 24 hours following start of treatment.  Push fluids.  Ibuprofen or Tylenol for pain as directed on package.  Return to clinic if not feeling much better in 2 or 3 days or new symptoms develop.      Supportive care discussed.  See discharge instructions below for specific recommendations given.    At the end of the encounter, I discussed results, diagnosis, medications. Discussed red flags for immediate return to clinic/ER, as well as indications for follow up if no improvement. Patient and/or caregiver understood and agreed to plan. Patient was stable for discharge.    SUBJECTIVE    HPI:  Mckay with sore throat x 7 days.      +achy, subjective fevers at onset.      Mild cough.      Felt better yesterday, but woke up feeling worse today.     Hasn't slept well due to ill child.                  History obtained from patient.    Past Medical History:   Diagnosis Date   ? Anemia    ? Herniated lumbar disc without myelopathy 2017   ? Postpartum depression     postpartum depression only. On Lexapro 10 mg currently.   ? Trauma     sep symphsis pubis from preg, no traum/viol   ? Varicella     as a child   ? Varicosities        Problem List:  2016-10:  (normal spontaneous vaginal delivery)  2016-10: Second degree perineal laceration  2016-10: Contraceptive surveillance  2016-10: Postpartum  care and examination of lactating mother  2016-10: Pregnant  2016-10: Normal labor  2016-09: Excessive weight gain during pregnancy  2016-04: Carrier of group B Streptococcus  2016-04: Supervision of other normal pregnancy  2015-07: Situational depression  2014-06: Normal delivery  2014-06: Supervision of other normal pregnancy  2014-06: Active labor at term  Cough  Vaginal Discharge  Separation Of Symphysis Pubis  Adjustment disorder with depressed mood      Social History     Tobacco Use   ? Smoking status: Never Smoker   ? Smokeless tobacco: Never Used   Substance Use Topics   ? Alcohol use: No       Review of Systems   HENT: Positive for sore throat. Negative for congestion, sinus pressure (better ) and sinus pain.    Respiratory: Negative for cough.    Skin: Negative for rash.   All other systems reviewed and are negative.      OBJECTIVE    Vitals:    12/31/18 1109   BP: 100/50   Patient Site: Right Arm   Patient Position: Sitting   Cuff Size: Adult Regular   Pulse: 81   Resp: 13   Temp: 98.2  F (36.8  C)   TempSrc: Oral   SpO2: 98%   Weight: 162 lb (73.5 kg)       Physical Exam   Constitutional: She is oriented to person, place, and time. She appears well-developed and well-nourished. No distress.   HENT:   Right Ear: External ear normal.   Left Ear: External ear normal.   Mouth/Throat: Mucous membranes are normal. Posterior oropharyngeal erythema present. No oropharyngeal exudate or tonsillar abscesses. Tonsils are 1+ on the right. Tonsils are 2+ on the left. No tonsillar exudate.   Eyes: Conjunctivae are normal. Right eye exhibits no discharge. Left eye exhibits no discharge.   Cardiovascular: Intact distal pulses.   Pulmonary/Chest: Effort normal.   Musculoskeletal: Normal range of motion.   Lymphadenopathy:     She has no cervical adenopathy.   Neurological: She is alert and oriented to person, place, and time.   Skin: Skin is warm and dry. Capillary refill takes less than 2 seconds.   Psychiatric: She has  a normal mood and affect. Her behavior is normal. Judgment and thought content normal.       Labs:  Recent Results (from the past 240 hour(s))   Rapid Strep A Screen-Throat   Result Value Ref Range    Rapid Strep A Antigen Group A Strep detected (!) No Group A Strep detected, presumptive negative         Radiology:    No results found.    PATIENT INSTRUCTIONS:   Patient Instructions   Tylenol, ibuprofen for pain.    Sleep!    Throw away toothbrush after 24 hours.  No work/school for at least 24 hours following start of treatment or for 24 hours after temperature less than 100.4 F.  Push fluids.  Ibuprofen or Tylenol for pain as directed on package.  Return to clinic if not feeling much better in 2 or 3 days or new symptoms develop.            Patient Education     Pharyngitis: Strep (Confirmed)    You have had a positive test for strep throat. Strep throat is a contagious illness. It is spread by coughing, kissing or by touching others after touching your mouth or nose. Symptoms include throat pain that is worse with swallowing, aching all over, headache, and fever. It is treated with antibiotic medicine. This should help you start to feel better in 1 to 2 days.  Home care    Rest at home. Drink plenty of fluids to you won't get dehydrated.    No work or school for the first 2 days of taking the antibiotics. After this time, you will not be contagious. You can then return to school or work if you are feeling better.     Take antibiotic medicine for the full 10 days, even if you feel better. This is very important to ensure the infection is treated. It is also important to prevent medicine-resistant germs from developing. If you were given an antibiotic shot, you don't need any more antibiotics.    You may use acetaminophen or ibuprofen to control pain or fever, unless another medicine was prescribed for this. Talk with your healthcare provider before taking these medicines if you have chronic liver or kidney  disease. Also talk with your healthcare provider if you have had a stomach ulcer or GI bleeding.    Throat lozenges or sprays help reduce pain. Gargling with warm saltwater will also reduce throat pain. Dissolve 1/2 teaspoon of salt in 1 glass of warm water. This may be useful just before meals.     Soft foods are OK. Don't eat salty or spicy foods.  Follow-up care  Follow up with your healthcare provider or our staff if you don't get better over the next week.  When to seek medical advice  Call your healthcare provider right away if any of these occur:    Fever of 100.4 F (38 C) or higher, or as directed by your healthcare provider    New or worsening ear pain, sinus pain, or headache    Painful lumps in the back of neck    Stiff neck    Lymph nodes getting larger or becoming soft in the middle    You can't swallow liquids or you can't open your mouth wide because of throat pain    Signs of dehydration. These include very dark urine or no urine, sunken eyes, and dizziness.    Trouble breathing or noisy breathing    Muffled voice    Rash  Prevention  Here are steps you can take to help prevent an infection:    Keep good hand washing habits.    Dont have close contact with people who have sore throats, colds, or other upper respiratory infections.    Dont smoke, and stay away from secondhand smoke.  Date Last Reviewed: 11/1/2017 2000-2017 [employer]. [address] [place] [address], [place] [address]. All rights reserved. This information is not intended as a substitute for professional medical care. Always follow your healthcare professional's instructions.

## 2021-08-07 ENCOUNTER — HEALTH MAINTENANCE LETTER (OUTPATIENT)
Age: 36
End: 2021-08-07

## 2021-09-15 ENCOUNTER — OFFICE VISIT (OUTPATIENT)
Dept: MIDWIFE SERVICES | Facility: CLINIC | Age: 36
End: 2021-09-15
Payer: COMMERCIAL

## 2021-09-15 VITALS
WEIGHT: 168 LBS | SYSTOLIC BLOOD PRESSURE: 104 MMHG | HEIGHT: 71 IN | DIASTOLIC BLOOD PRESSURE: 72 MMHG | OXYGEN SATURATION: 97 % | HEART RATE: 66 BPM | BODY MASS INDEX: 23.52 KG/M2

## 2021-09-15 DIAGNOSIS — Z01.419 WELL WOMAN EXAM WITH ROUTINE GYNECOLOGICAL EXAM: Primary | ICD-10-CM

## 2021-09-15 PROCEDURE — 99395 PREV VISIT EST AGE 18-39: CPT | Performed by: MIDWIFE

## 2021-09-15 ASSESSMENT — PATIENT HEALTH QUESTIONNAIRE - PHQ9: SUM OF ALL RESPONSES TO PHQ QUESTIONS 1-9: 5

## 2021-09-15 ASSESSMENT — MIFFLIN-ST. JEOR: SCORE: 1535.23

## 2021-09-15 NOTE — PROGRESS NOTES
Annual Health Maintenance Exam    Subjective:     Deanne is a 36 year old female who presents for an annual exam.  She is an established patient to the [facility]. She reports she is doing well! Has made a number of lifestyle adjustments over the past few years and is now doing well.  She is currently being treated for depression with [facility]. She is in couples therapy with her . PHQ9 today is 5, 0 to #10.  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  She is currently in a mutually monogamous relationship with her  Farrukh.  They have 4 children.  She declines STI testing today.  She is using Paraguard for birth control.  She reports normal periods, approximately every month lasting for 4-6 days with moderate bleeding.  She did contract COVID one month ago and had primarily GI symptoms, which have resolved.   She is not currently planning future pregnancies, but might consider it.      Allergies  Patient has no known allergies.    Current Medications  Current Outpatient Medications   Medication Sig Dispense Refill     cholecalciferol, vitamin D3, (VITAMIN D3 ORAL) [CHOLECALCIFEROL, VITAMIN D3, (VITAMIN D3 ORAL)] Take by mouth.       copper (PARAGARD T 380A) 380 square mm IUD IUD [COPPER (PARAGARD T 380A) 380 SQUARE MM IUD IUD] 1 each by Intrauterine route once.       desvenlafaxine succinate (PRISTIQ) 50 MG 24 hr tablet [DESVENLAFAXINE SUCCINATE (PRISTIQ) 50 MG 24 HR TABLET] Take 1 tablet (50 mg total) by mouth daily. 30 tablet 12       Past Medical/Surgical History  No past medical history on file.  Past Surgical History:   Procedure Laterality Date     WISDOM TOOTH EXTRACTION      14 y/o       Obstetric and Gynecologic History  Patient's last menstrual period was 2021 (exact date).    OB History    Para Term  AB Living   4 4 4 0 0 4   SAB TAB Ectopic Multiple Live Births   0 0 0 0 4      # Outcome Date GA Lbr Pantera/2nd  Weight Sex Delivery Anes PTL Lv   4 Term 10/10/16 40w2d  3.771 kg (8 lb 5 oz) M Vag-Spont  N MARTINEZ      Name: Christopher   3 Term 06/10/14 40w1d 07:45 / 00:05 3.26 kg (7 lb 3 oz) M Waterbirth None N MARTINEZ      Name: MELVIN CORBIN-DANA      Apgar1: 9  Apgar5: 9   2 Term 03/01/11   3.714 kg (8 lb 3 oz) F Vag-Spont None N MARTINEZ      Name: Patience   1 Term 12/05/08   3.714 kg (8 lb 3 oz) F Vag-Spont None N MARTINEZ      Name: Wesley       Last Pap: 2018.  Results were: normal  Last mammogram: NA    Immunization History  Status updated.    Family History  Family History   Problem Relation Age of Onset     No Known Problems Mother      Hypertension Father      Arthritis Father      No Known Problems Sister      No Known Problems Brother      Breast Cancer Maternal Aunt      Cancer Paternal Aunt      Arthritis Maternal Grandmother      Dementia Maternal Grandfather      No Known Problems Paternal Grandmother      Kidney Disease Paternal Grandfather        Health Maintenance  Diet:  general  Regular Exercise: Yes.  Yoga, walk, calesthenics.    Caffeine use:  yes  Sunscreen Use: Yes.   Dental Visits Regularly: Yes  Seat Belt: Yes  Self Breast Exam Monthly:NoAbuse History:  Does not have a history of abuse.  Currently does feel safe in all her relationships.      Social History  Social History     Socioeconomic History     Marital status:      Spouse name: Not on file     Number of children: 4     Years of education: college     Highest education level: Not on file   Occupational History     Not on file   Tobacco Use     Smoking status: Never Smoker     Smokeless tobacco: Never Used   Substance and Sexual Activity     Alcohol use: No     Drug use: No     Sexual activity: Yes     Partners: Male     Birth control/protection: I.U.D.   Other Topics Concern     Not on file   Social History Narrative     Not on file     Social Determinants of Health     Financial Resource Strain:      Difficulty of Paying Living Expenses:    Food  "Insecurity:      Worried About Running Out of Food in the Last Year:      Ran Out of Food in the Last Year:    Transportation Needs:      Lack of Transportation (Medical):      Lack of Transportation (Non-Medical):    Physical Activity:      Days of Exercise per Week:      Minutes of Exercise per Session:    Stress:      Feeling of Stress :    Social Connections:      Frequency of Communication with Friends and Family:      Frequency of Social Gatherings with Friends and Family:      Attends [employer] Services:      Active Member of Clubs or Organizations:      Attends Club or Organization Meetings:      Marital Status:    Intimate Partner Violence:      Fear of Current or Ex-Partner:      Emotionally Abused:      Physically Abused:      Sexually Abused:        Further Screening History  Colonoscopy: NA.  Lipid Profile: needed, declines today.  Glucose Screen: normal in pregnancy.  Last Dexa: NA.    Review of Systems  A 12 point comprehensive review of systems was negative except as noted.    Objective:      [unfilled]  Vitals:    09/15/21 0849   BP: 104/72   Pulse: 66   SpO2: 97%   Weight: 76.2 kg (168 lb)   Height: 1.791 m (5' 10.5\")       Physical Exam:  General: Pleasant, articulate, well-groomed, well-nourished female.  Not in any apparent distress.  Neck: Supple.  Thyroid: Small, symmetrical, no nodules noted.  Lymphadenopathy: Negative.  Lungs: Clear to auscultation bilaterally, and a nonlabored breathing pattern.  Cardio: Regular rate and rhythm, negative for murmur.   Breasts: Symmetrical, nontender, no masses, negative lymphadenopathy, negative nipple discharge.  Abdomen: Soft, nontender, no masses, negative CVAT.  External genitalia: Normal hair distribution, no lesions.  Urethral opening: Without lesions, or tenderness.   Bladder: Without masses, or tenderness.  Vagina: Pink, rugated, normal-appearing discharge.  Cervix: posterior, smooth, no lesions.  Pap smear not obtained. IUD strings palpated " and approximately 2 cm in length.  Bimanual: Finds uterus small anteverted, mobile, nontender, no masses.  Negative CMT.  Adnexa: without masses or tenderness.    Lower extremities: +1 reflexes, no significant edema.    Assessment / Plan:     1) Annual health maintenance exam generally within normal limits today.  2) Depression- stable on medication and with therapy  3) IUD in apparently proper positioning    1. Discussed nutrition and exercise.  Advised MVI, Vitamin D3 4000IU geltab and an omega 3 supplement daily.  Accepts the following HM labs: none today.   Breast self exam technique reviewed and patient encouraged to perform self-exam monthly. Contraception: IUD.  Next pap due 2023. HPV co-testing discussed with that pap, then paps q 5 yrs if both negative.  2. Continue with therapy and see PCP for management of medications.    3. Advised mammogram at age 39 for baseline screening.  4.Advised checking for iUD strings once monthly to ensure it is properly placed.   5. Consider restarting a prenatal vitamin (or continue multivitamin) for folic acid supplementation in case she decides to pursue another pregnancy.  6.  RTC 1 year for annual physical exam, or PRN.    40 minutes on the date of the encounter doing chart review, patient visit and documentation

## 2021-10-02 ENCOUNTER — HEALTH MAINTENANCE LETTER (OUTPATIENT)
Age: 36
End: 2021-10-02

## 2021-11-06 ENCOUNTER — NURSE TRIAGE (OUTPATIENT)
Dept: NURSING | Facility: CLINIC | Age: 36
End: 2021-11-06

## 2021-11-06 NOTE — TELEPHONE ENCOUNTER
"Patient injured her eye yesterday morning, son poked it with his hand. It hurt right away and then was fine for several hours. \"Yesterday it started to feel irritated in the afternoon and just continued to get worse and worse.: Patient reports flushing the eye yesterday to try to help but it did not. There is a lot of tears and swelling. Both upper and lower lids are swollen and about 25% bigger with swelling. Patient admits to blurred vision from her right eye. Patient states it feels scratchy and painful to try and open it and when she does she also has pain in her left eye.   Protocol recommends ED now.  Care advice given.  will drive to [facility] ED which is closest to their home.   Yue Pettit RN   11/06/21 7:41 AM  [facility] Nurse Advisor  COVID 19 Nurse Triage Plan/Patient Instructions    Please be aware that novel coronavirus (COVID-19) may be circulating in the community. If you develop symptoms such as fever, cough, or SOB or if you have concerns about the presence of another infection including coronavirus (COVID-19), please contact your health care provider or visit [email or web address].     Disposition/Instructions    ED Visit recommended. Follow protocol based instructions.     Bring Your Own Device:  Please also bring your smart device(s) (smart phones, tablets, laptops) and their charging cables for your personal use and to communicate with your care team during your visit.    Thank you for taking steps to prevent the spread of this virus.  o Limit your contact with others.  o Wear a simple mask to cover your cough.  o Wash your hands well and often.    Resources    [facility]: About COVID-19: [email or web address]    CDC: What to Do If You're Sick: www.cdc.gov/coronavirus/2019-ncov/about/steps-when-sick.html    CDC: Ending Home Isolation: www.cdc.gov/coronavirus/2019-ncov/hcp/disposition-in-home-patients.html     CDC: Caring for Someone: " www.cdc.gov/coronavirus/2019-ncov/if-you-are-sick/care-for-someone.html     [facility]: Interim Guidance for Hospital Discharge to Home: www.health.[facility].[place]/diseases/coronavirus/hcp/hospdischarge.pdf    [facility] clinical trials (COVID-19 research studies): [email or web address]     Below are the COVID-19 hotlines at the [place] Department of Health ([facility]). Interpreters are available.   o For health questions: Call 321-090-0180 or 1-345.766.1722 (7 a.m. to 7 p.m.)  o For questions about schools and childcare: Call 728-842-3840 or 1-936.479.2552 (7 a.m. to 7 p.m.)       Reason for Disposition    Vision is blurred or lost in either eye    Additional Information    Negative: [1] Major bleeding (e.g., actively dripping or spurting) AND [2] can't be stopped    Negative: Knocked out (unconscious) > 1 minute    Negative: Difficult to awaken or acting confused (e.g., disoriented, slurred speech)    Negative: Severe neck pain    Negative: Sounds like a life-threatening emergency to the triager    Negative: Wound looks infected    Negative: Foreign body in the eye    Negative: Head injury is the primary problem    Negative: Neck injury is the primary problem    Protocols used: EYE INJURY-A-

## 2021-12-13 ENCOUNTER — IMMUNIZATION (OUTPATIENT)
Dept: NURSING | Facility: CLINIC | Age: 36
End: 2021-12-13
Payer: COMMERCIAL

## 2021-12-13 PROCEDURE — 91301 PR COVID VAC MODERNA 100 MCG/0.5 ML IM: CPT

## 2021-12-13 PROCEDURE — 0011A PR COVID VAC MODERNA 100 MCG/0.5 ML IM: CPT

## 2022-01-04 ENCOUNTER — E-VISIT (OUTPATIENT)
Dept: URGENT CARE | Facility: URGENT CARE | Age: 37
End: 2022-01-04
Payer: COMMERCIAL

## 2022-01-04 DIAGNOSIS — Z20.822 SUSPECTED COVID-19 VIRUS INFECTION: Primary | ICD-10-CM

## 2022-01-04 PROCEDURE — 99421 OL DIG E/M SVC 5-10 MIN: CPT | Performed by: PHYSICIAN ASSISTANT

## 2022-01-04 NOTE — PATIENT INSTRUCTIONS
Deanne,      Based on your responses, you may have coronavirus (COVID-19). This illness can cause fever, cough and trouble breathing. Many people get a mild case and get better on their own. Some people can get very sick.    Will I be tested for COVID-19?  We would like to test you for COVID-19 virus. I have placed orders for this test.     To schedule: go to your [facility] home page and scroll down to the section that says  You have an appointment that needs to be scheduled  and click the large green button that says  Schedule Now  and follow the steps to find the next available openings.    If you are unable to complete these [facility] scheduling steps, please call [phone] to schedule your testing.     Return to work/school/ guidance:  Please let your workplace manager and staffing office know when your isolation ends.       If you receive a positive COVID-19 test result, follow the guidance of the those who are giving you the results. Usually the return to work is 10 days from symptom onset or positive test date, (or in some cases 20 days if you are immunocompromised). If your symptoms started after your positive test, the 10 days should start when your symptoms started.   o If you work at [employer], you must also be cleared by Employee Occupational Health and Safety to return to work.      If you receive a negative COVID-19 test result and did not have a high risk exposure to someone with a known positive COVID-19 test, you can return to work once you're free of fever for 24 hours without fever-reducing medication and your symptoms are improving or resolved.    If you receive a negative COVID-19 test and had a high-risk exposure to someone who has tested positive for COVID-19 then you can return to work 14 days after your last contact with the positive individual. Follow quarantine guidance given by your doctor or public health officials.     Sign up for Get[employer] Loop:  We know it's scary to hear  that you might have COVID-19. We want to track your symptoms to make sure you're okay over the next 2 weeks. Please look for an email from [employer]--this is a free, online program that we'll use to keep in touch. To sign up, follow the link in the email you will receive. Learn more at [email or web address]    How can I take care of myself?  Over the counter medications may help with your symptoms like congestion, cough, chills, or fever.    There are not many effective prescription treatments for early COVID-19. Hydroxychloroquine, ivermectin, and azithromycin are not effective or recommended for COVID-19.    If your symptoms started in the last 10 days, you may be able to receive a treatment with monoclonal antibodies. This treatment can lower your risk of severe illness and going to the hospital. It is given through an IV or under your skin (subcutaneous) and must be given at an infusion center. You must be 12 or older, weight at least 88 pounds, and have a positive COVID-19 test.     If you would like to sign up to be considered to receive the monoclonal antibody medicine, please complete a participation form through the [facility] here: MNRAP ([email or web address]). You may also call the [facility] COVID-19 Public Hotline at 1-147.782.7381 (open Mon-Fri: 9am-7pm and Sat: 10am-6pm).     Not all people who are eligible will receive the medicine, since supply is limited. You will be contacted in the next 1 to 2 business days only if you are selected. If you do not receive a call, you have not been selected to receive the medicine. If you have any questions about this medication, please contact your primary care provider. For more information, see [email or web address]/[email or web address]      Get lots of rest. Drink extra fluids (unless a doctor has told you not to)    Take Tylenol (acetaminophen) or ibuprofen for fever or  pain. If you have liver or kidney problems, ask your family doctor if it's okay to take Tylenol o ibuprofen    Take over the counter medications for your symptoms, as directed by your doctor. You may also talk to your pharmacist.      If you have other health problems (like cancer, heart failure, an organ transplant or severe kidney disease): Call your specialty clinic if you don't feel better in the next 2 days.    Know when to call 911. Emergency warning signs include:  o Trouble breathing or shortness of breath  o Pain or pressure in the chest that doesn't go away  o Feeling confused like you haven't felt before, or not being able to wake up  o Bluish-colored lips or face    Where can I get more information?    [facility] - About COVID-19: [email or web address]     CDC - What to Do If You're Sick:     www.cdc.gov/coronavirus/2019-ncov/about/steps-when-sick.html    CDC - Ending Home Isolation:  https://www.cdc.gov/coronavirus/2019-ncov/your-health/quarantine-isolation.html    CDC - Caring for Someone:  www.cdc.gov/coronavirus/2019-ncov/if-you-are-sick/care-for-someone.html    [facility] clinical trials (COVID-19 research studies): clinicalaffairs.[facility]-clinical-trials    Below are the COVID-19 hotlines at the [facility] ([facility]). Interpreters are available.  o For health questions: Call [phone] or 1-299.426.5897 (7 a.m. to 7 p.m.)  o For questions about schools and childcare: Call 322-051-4423 or 1-405.453.2930 (7 a.m. to 7 p.m.)  January 4, 2022  RE:  Deanne Rico                                                                                                                  [address]  [facility] [address]      To whom it may concern:    I evaluated Deanne Rico on January 4, 2022. Deanne Rico should be excused from work/school.     They should let their workplace manager and staffing office know when their quarantine ends.    We can not give an  exact date as it depends on the information below. They can calculate this on their own or work with their manager/staffing office to calculate this. (For example if they were exposed on 10/04, they would have to quarantine for 14 full days. That would be through 10/18. They could return on 10/19.)    Quarantine Guidelines:    If patient receives a positive COVID-19 test result, they should follow the guidance of those who are giving the results. Usually the return to work is 10 (or in some cases 20 days from symptom onset.) If they work at [employer], they must be cleared by Employee Occupational Health and Safety to return to work.      If patient receives a negative COVID-19 test result and did not have a high risk exposure to someone with a known positive COVID-19 test, they can return to work once they're free of fever for 24 hours without fever-reducing medication and their symptoms are improving or resolved.    If patient receives a negative COVID-19 test and if they had a high risk exposure to someone who has tested positive for COVID-19 then they can return to work 14 days after their last contact with the positive individual    Note: If there is ongoing exposure to the covid positive person, this quarantine period may be longer than 14 days. (For example, if they are continually exposed to their child, who tested positive and cannot isolate from them, then the quarantine of 7-14 days can't start until their child is no longer contagious. This is typically 10 days from onset to the child's symptoms. So the total duration may be 17-24 days in this case.)     Sincerely,  Vernon Nam PA-C          o

## 2022-01-09 ENCOUNTER — LAB (OUTPATIENT)
Dept: URGENT CARE | Facility: URGENT CARE | Age: 37
End: 2022-01-09
Attending: PHYSICIAN ASSISTANT
Payer: COMMERCIAL

## 2022-01-09 DIAGNOSIS — Z20.822 SUSPECTED COVID-19 VIRUS INFECTION: ICD-10-CM

## 2022-01-09 LAB — SARS-COV-2 RNA RESP QL NAA+PROBE: NEGATIVE

## 2022-01-09 PROCEDURE — U0003 INFECTIOUS AGENT DETECTION BY NUCLEIC ACID (DNA OR RNA); SEVERE ACUTE RESPIRATORY SYNDROME CORONAVIRUS 2 (SARS-COV-2) (CORONAVIRUS DISEASE [COVID-19]), AMPLIFIED PROBE TECHNIQUE, MAKING USE OF HIGH THROUGHPUT TECHNOLOGIES AS DESCRIBED BY CMS-2020-01-R: HCPCS

## 2022-01-09 PROCEDURE — U0005 INFEC AGEN DETEC AMPLI PROBE: HCPCS

## 2022-01-10 ENCOUNTER — IMMUNIZATION (OUTPATIENT)
Dept: NURSING | Facility: CLINIC | Age: 37
End: 2022-01-10
Attending: PEDIATRICS
Payer: COMMERCIAL

## 2022-01-10 PROCEDURE — 91301 PR COVID VAC MODERNA 100 MCG/0.5 ML IM: CPT

## 2022-01-10 PROCEDURE — 0012A PR COVID VAC MODERNA 100 MCG/0.5 ML IM: CPT

## 2022-05-16 NOTE — TELEPHONE ENCOUNTER
RN cannot approve Refill Request    RN can NOT refill this medication PCP messaged that patient is overdue for Labs. Last office visit: 6/14/2019 Vimal Gentile MD Last Physical: Visit date not found Last MTM visit: Visit date not found Last visit same specialty: 6/14/2019 Vimal Gentile MD.  Next visit within 3 mo: Visit date not found  Next physical within 3 mo: Visit date not found      Cherelle Thomas, [facility] Triage/Med Refill 4/22/2021    Requested Prescriptions   Pending Prescriptions Disp Refills     desvenlafaxine succinate (PRISTIQ) 50 MG 24 hr tablet 30 tablet 5     Sig: Take 1 tablet (50 mg total) by mouth daily.       Venlafaxine/Desvenlafaxine Refill Protocol Failed - 4/22/2021  8:54 AM        Failed - LFT or AST or ALT in last year     Albumin   Date Value Ref Range Status   06/24/2011 4.4 3.5 - 5.0 g/dL Final     Bilirubin, Total   Date Value Ref Range Status   06/24/2011 0.6 <1.1 mg/dL Final     Alkaline Phosphatase   Date Value Ref Range Status   06/24/2011 82 45 - 120 IU/L Final     AST   Date Value Ref Range Status   06/24/2011 11 <41 IU/L Final     ALT   Date Value Ref Range Status   06/24/2011 11 <46 IU/L Final     Protein, Total   Date Value Ref Range Status   06/24/2011 7.3 6.0 - 8.0 g/dL Final                Failed - Fasting lipid cascade in last year     Patient Fasting > 8hrs?   Date Value Ref Range Status   07/18/2016 Yes  Final             Failed - PCP or prescribing provider visit in last year     Last office visit with prescriber/PCP: 6/14/2019 Vimal Gentile MD OR same dept: Visit date not found OR same specialty: 6/14/2019 Vimal Gentile MD  Last physical: Visit date not found Last MTM visit: Visit date not found   Next visit within 3 mo: Visit date not found  Next physical within 3 mo: Visit date not found  Prescriber OR PCP: Vimal Gentile MD  Last diagnosis associated with med order: 1. Anxiety  - desvenlafaxine succinate (PRISTIQ) 50 MG 24 hr tablet; Take 1  (3) walks occasionally tablet (50 mg total) by mouth daily.  Dispense: 30 tablet; Refill: 5    If protocol passes may refill for 12 months if within 3 months of last provider visit (or a total of 15 months).             Failed - Blood Pressure in last year     BP Readings from Last 1 Encounters:   11/11/19 101/68

## 2022-05-24 ENCOUNTER — TELEPHONE (OUTPATIENT)
Dept: FAMILY MEDICINE | Facility: CLINIC | Age: 37
End: 2022-05-24
Payer: COMMERCIAL

## 2022-05-27 ENCOUNTER — E-VISIT (OUTPATIENT)
Dept: FAMILY MEDICINE | Facility: CLINIC | Age: 37
End: 2022-05-27
Payer: COMMERCIAL

## 2022-05-27 DIAGNOSIS — F41.9 ANXIETY: ICD-10-CM

## 2022-05-27 PROCEDURE — 99421 OL DIG E/M SVC 5-10 MIN: CPT | Performed by: FAMILY MEDICINE

## 2022-05-27 RX ORDER — DESVENLAFAXINE 50 MG/1
50 TABLET, FILM COATED, EXTENDED RELEASE ORAL DAILY
Qty: 30 TABLET | Refills: 12 | Status: SHIPPED | OUTPATIENT
Start: 2022-05-27

## 2022-05-27 ASSESSMENT — ANXIETY QUESTIONNAIRES
GAD7 TOTAL SCORE: 5
8. IF YOU CHECKED OFF ANY PROBLEMS, HOW DIFFICULT HAVE THESE MADE IT FOR YOU TO DO YOUR WORK, TAKE CARE OF THINGS AT HOME, OR GET ALONG WITH OTHER PEOPLE?: VERY DIFFICULT
1. FEELING NERVOUS, ANXIOUS, OR ON EDGE: SEVERAL DAYS
5. BEING SO RESTLESS THAT IT IS HARD TO SIT STILL: NOT AT ALL
4. TROUBLE RELAXING: SEVERAL DAYS
GAD7 TOTAL SCORE: 5
GAD7 TOTAL SCORE: 5
6. BECOMING EASILY ANNOYED OR IRRITABLE: SEVERAL DAYS
7. FEELING AFRAID AS IF SOMETHING AWFUL MIGHT HAPPEN: NOT AT ALL
3. WORRYING TOO MUCH ABOUT DIFFERENT THINGS: SEVERAL DAYS
2. NOT BEING ABLE TO STOP OR CONTROL WORRYING: SEVERAL DAYS
7. FEELING AFRAID AS IF SOMETHING AWFUL MIGHT HAPPEN: NOT AT ALL

## 2022-05-27 ASSESSMENT — PATIENT HEALTH QUESTIONNAIRE - PHQ9
SUM OF ALL RESPONSES TO PHQ QUESTIONS 1-9: 7
SUM OF ALL RESPONSES TO PHQ QUESTIONS 1-9: 7
10. IF YOU CHECKED OFF ANY PROBLEMS, HOW DIFFICULT HAVE THESE PROBLEMS MADE IT FOR YOU TO DO YOUR WORK, TAKE CARE OF THINGS AT HOME, OR GET ALONG WITH OTHER PEOPLE: EXTREMELY DIFFICULT

## 2022-05-27 NOTE — TELEPHONE ENCOUNTER
Provider E-Visit time total (minutes): 5 min        Anything else you would like to add? I think I misread the label on the medication and didn't realize I would not be able to fill it this last week.  Stopping suddenly on Monday has led to many difficult symptoms (dizziness, nausea, mental fog, crying, irritability, sleeplessness, hot flashes, tingling on skin) and I haven't been able to go to work.         Prior to all of this I was wanting to arrange to re-evaluate my medication because of ongoing trouble with sleeping.  At this point, I don't know what would be best.  I've been 5 days off of the Pristiq and it's been terrible. I'd rather not incur even more craziness with trying something new.   Are you pregnant or breastfeeding? I am confident that I am neither

## 2022-05-27 NOTE — PATIENT INSTRUCTIONS
Using Antidepressants  Depression is a mood disorder that affects the way you think and feel. The most common symptom is a feeling of deep sadness. This feeling doesn't go away or get better on its own. But most types of depression can be helped with therapy and antidepressant medicines. (Note: This covers antidepressant use in adults only.)     What do antidepressants do?  Antidepressants restore the balance of certain chemicals in your brain to help ease your depression. You will likely feel better in 4 to 6 weeks. But you may continue taking antidepressants for a year or more to keep your symptoms from coming back. Some people with depression need to take antidepressants for life. There are several types of antidepressants. The main types are described below.   Selective serotonin reuptake inhibitors (SSRIs)  SSRIs are effective medicines for the treatment of depression. They tend to have fewer side effects than other antidepressants. Possible side effects include anxiety, trouble sleeping, nausea, diarrhea, sexual dysfunction, and headaches. In rare cases, they may make you more depressed. SSRIs shouldn t be mixed with certain other medicines. Talk with your healthcare provider about all the medicines, herbs, and supplements you are taking.   Tricyclic antidepressants  Tricyclics help severe or long-term depression. They have been used for many years with good results. Possible side effects include blurred vision, dry mouth, and constipation.   Monoamine oxidase inhibitors (MAOIs)  If you don t respond to tricyclics or SSRIs, your healthcare provider may prescribe MAOIs. These medicines can be very effective. But people taking MAOIs must stay away from certain foods and medicines. Your healthcare provider can tell you more.   Lithium  If you have bipolar disorder, you may take a medicine called lithium. This medicine helps even out your mood. Possible side effects are weight gain, trembling, loose stool, and  nausea. Lithium is also used:     For people who have unipolar depression and have not responded to other antidepressants    For people who have a sudden (acute) episode of unipolar depression    As a maintenance medicine to prevent unipolar depression from happening again  Things to stay away from if you are taking MAOIs   If you are taking MAOIs, don t have any of the following:     Beans    Aged cheese    Chocolate    Red wine    Most cold medicines    Certain medicines (ask your healthcare provider)  To reduce the risk of lithium poisoning  You can reduce the risk of lithium poisoning by following this advice:    Take only the prescribed amount of lithium. If your depressive symptoms get worse, contact your healthcare provider. Never increase or decrease your medicine on your own.    Drink plenty of fluids other than coffee, tea, and soda.    Limit salt in your diet.    Before using other prescribed medicines or over-the-counter medicines, check with your pharmacist. This is to be sure the medicines won t interact with the lithium.    Never share your medicines or use another person's medicines, even if it is the same medicine and dose.    Keep follow-up appointments.    Have your lithium blood level checked as advised. You will need blood work more often when symptoms are not under control.  If you have side effects  The side effects of antidepressants are usually mild. But if you have troubling side effects, call your healthcare provider. Changing the dosage or type of medicine may help. Never stop taking medicines on your own.   [employer] last reviewed this educational content on 9/1/2019 2000-2021 [employer]. All rights reserved. This information is not intended as a substitute for professional medical care. Always follow your healthcare professional's instructions.          Depression: Tips to Help Yourself    As your healthcare providers help treat your depression, you can also help  yourself. Keep in mind that your illness affects you emotionally, physically, mentally, and socially. So full recovery will take time. Take care of your body and your soul, and be patient with yourself as you get better.  Self-care    Educate yourself. Read about treatment and medicine options. If you have the energy, attend local conferences or support groups. Keep a list of useful websites and helpful books and use them as needed. This illness is not your fault. Don t blame yourself for your depression.    Manage early symptoms. If you notice symptoms returning, experience triggers, or identify other factors that may lead to a depressive episode, get help as soon as possible. Ask trusted friends and family to monitor your behavior and let you know if they see anything of concern.    Work with your provider. Find a provider you can trust. Communicate honestly with that person and share information on your treatment for depression and your reaction to medicines.    Be prepared for a crisis. Know what to do if you experience a crisis. Keep the phone number of a crisis hotline and know the location of your community's urgent care centers and the closest emergency department.    Hold off on big decisions. Depression can cloud your judgment. So wait until you feel better before making major life decisions, such as changing jobs, moving, or getting  or .    Be patient. Recovering from depression is a process. Don t be discouraged if it takes some time to feel better.    Keep it simple. Depression saps your energy and concentration. So you won t be able to do all the things you used to do. Set small goals and do what you can.    Be with others. Don t isolate yourself--you ll only feel worse. Try to be with other people. And take part in fun activities when you can. Go to a movie, ballgame, Jainism service, or social event. Talk openly with people you can trust. And accept help when it s offered.    Take  care of your body  People with depression often lose the desire to take care of themselves. That only makes their problems worse. During treatment and afterward, make a point to:    Exercise. It s a great way to take care of your body. And studies have shown that exercise helps fight depression. Aim for 30 minutes of moderate activity a day. Walking in small blocks of time (5-10 minutes) is a good way to start, but anything that gets you moving (gardening, house cleaning) counts.    Don't use drugs and alcohol. These may ease the pain in the short term. But they ll only make your problems worse in the long run.    Get relief from stress. Ask your healthcare provider for relaxation exercises and techniques to help relieve stress. Consider activities like meditation, yoga, or Awais Chi.    Eat right. A balanced and healthy diet helps keep your body healthy.    Get adequate sleep. Aim for 8 hours per night. Too much or too little sleep can cause other physical and emotional problems.  [employer] last reviewed this educational content on 12/1/2019 2000-2021 [employer]. All rights reserved. This information is not intended as a substitute for professional medical care. Always follow your healthcare professional's instructions.          Depression Affects Your Mind and Body  Everyone feels sad or  blue  from time to time for a few days or weeks. Depression is when these feelings don't go away and they interfere with daily life. Depression is a real illness that can develop at any age. It is one of the most common mental health problems in the [place] Depression makes you feel sad, helpless, and hopeless. It gets in the way of your life and relationships. Depression causes chemical changes in the brain that inhibit your ability to think and act. But, with help, you can feel better again.     Depression affects your whole body  Brain chemicals affect your body as well as your mood. So depression may do more than  just make you feel low. You may also feel bad physically. Depression can:     Cause trouble with mental tasks such as remembering, concentrating, or making decisions    Make you feel nervous and jumpy    Cause trouble sleeping. Or you may sleep too much.    Change your appetite    Cause headaches, stomachaches, or other aches and pains    Drain your body of energy  Depression and other illness  It is common for people who have chronic health problems to also have depression. It can often be hard to tell which one caused the other. A person might become depressed after finding out they have a health problem. But some studies suggest being depressed may make certain health problems more likely. And some depressed people stop taking care of themselves. This may make them more likely to get sick.   [employer] last reviewed this educational content on 5/1/2020 2000-2021 [employer]. All rights reserved. This information is not intended as a substitute for professional medical care. Always follow your healthcare professional's instructions.

## 2022-05-28 ASSESSMENT — PATIENT HEALTH QUESTIONNAIRE - PHQ9: SUM OF ALL RESPONSES TO PHQ QUESTIONS 1-9: 7

## 2022-05-28 ASSESSMENT — ANXIETY QUESTIONNAIRES: GAD7 TOTAL SCORE: 5

## 2022-05-31 NOTE — TELEPHONE ENCOUNTER
Central Prior Authorization Team   Phone: [phone]      PA Initiation    Medication: Desvenlafaxine   Insurance Company: [employer] - Phone [phone] Fax [phone]  Pharmacy Filling the Rx: [facility]  [place] - [address] [place], [place] [address]  Filling Pharmacy Phone: [phone]  Filling Pharmacy Fax:    Start Date: 5/31/2022

## 2022-05-31 NOTE — TELEPHONE ENCOUNTER
Prior Authorization Approval    Authorization Effective Date: 5/5/2022  Authorization Expiration Date: 5/31/2023  Medication: Desvenlafaxine -APPROVED  Approved Dose/Quantity:   Reference #:     Insurance Company: [employer] - Phone [phone] Fax [phone]  Expected CoPay:       CoPay Card Available:      Foundation Assistance Needed:    Which Pharmacy is filling the prescription (Not needed for infusion/clinic administered): [facility]  [place], [address].  Pharmacy Notified: Yes  Patient Notified: No    **Instructed pharmacy to notify patient when script is ready to /ship.**

## 2022-06-24 ENCOUNTER — IMMUNIZATION (OUTPATIENT)
Dept: NURSING | Facility: CLINIC | Age: 37
End: 2022-06-24
Payer: COMMERCIAL

## 2022-06-24 PROCEDURE — 0064A COVID-19,PF,MODERNA (18+ YRS BOOSTER .25ML): CPT

## 2022-06-24 PROCEDURE — 91306 COVID-19,PF,MODERNA (18+ YRS BOOSTER .25ML): CPT

## 2022-09-03 ENCOUNTER — HEALTH MAINTENANCE LETTER (OUTPATIENT)
Age: 37
End: 2022-09-03

## 2022-12-26 ENCOUNTER — OFFICE VISIT (OUTPATIENT)
Dept: URGENT CARE | Facility: URGENT CARE | Age: 37
End: 2022-12-26
Payer: COMMERCIAL

## 2022-12-26 VITALS
TEMPERATURE: 98.4 F | DIASTOLIC BLOOD PRESSURE: 87 MMHG | SYSTOLIC BLOOD PRESSURE: 118 MMHG | BODY MASS INDEX: 24.16 KG/M2 | OXYGEN SATURATION: 97 % | HEART RATE: 88 BPM | RESPIRATION RATE: 16 BRPM | WEIGHT: 170.8 LBS

## 2022-12-26 DIAGNOSIS — J02.0 STREP PHARYNGITIS: Primary | ICD-10-CM

## 2022-12-26 LAB
DEPRECATED S PYO AG THROAT QL EIA: POSITIVE
FLUAV AG SPEC QL IA: NEGATIVE
FLUBV AG SPEC QL IA: NEGATIVE

## 2022-12-26 PROCEDURE — 87804 INFLUENZA ASSAY W/OPTIC: CPT | Performed by: EMERGENCY MEDICINE

## 2022-12-26 PROCEDURE — 87880 STREP A ASSAY W/OPTIC: CPT | Performed by: EMERGENCY MEDICINE

## 2022-12-26 PROCEDURE — 99213 OFFICE O/P EST LOW 20 MIN: CPT | Performed by: EMERGENCY MEDICINE

## 2022-12-26 RX ORDER — AMOXICILLIN 500 MG/1
500 CAPSULE ORAL 2 TIMES DAILY
Qty: 20 CAPSULE | Refills: 0 | Status: SHIPPED | OUTPATIENT
Start: 2022-12-26 | End: 2023-01-05

## 2022-12-26 NOTE — PROGRESS NOTES
Assessment & Plan     Diagnosis:    (J02.0) Strep pharyngitis  (primary encounter diagnosis)  Plan:  amoxicillin (AMOXIL) 500 MG         capsule      Medical Decision Making:  Deanne Rico is a 37 year old female who presents with sore throat and clinical evidence of pharyngitis.  The rapid strep test is positive. I see no clinical evidence of  peritonsillar abscess, retropharyngeal abscess, Lemierre's Syndrome, epiglottis, or Shelton's angina. The patient's symptoms are consistent with streptococcal pharyngitis.  Uvula midline.  Non-toxic appearing.  No drooling.  No stridor. I have recommended treatment with antibiotics and analgesics.  Go to the ER if increasing pain, change in voice, neck pain, vomiting, fever, or shortness of breath. Follow-up with primary physician or urgent care if not improving in 3 days. Patient verbalized understanding and agreement with the plan.    [facility]    Subjective     Deanne Rico is a 37 year old female who presents with  to clinic today for the following health issues:  Chief Complaint   Patient presents with     Urgent Care     Pharyngitis     Really bad sore throat hit last night, a little bit of cough, felt like chills last night        HPI    Onset of symptoms was last night with a very bad sore throat and chills. Has had intermittent URI symptoms for over a week on and off though.   Course of illness is waxing and waning.  Throat is getting more painful since last night.  Severity moderate  Current and Associated symptoms: fever, chills, runny nose, sore throat, headache and body aches  Denies wheezing, shortness of breath, vomiting, diarrhea, not eating and taking in fluids?  Yes  Treatment measures tried include Tylenol/Ibuprofen and OTC Cough med  Predisposing factors include recent illness (cold) and sick contacts at home.     No reported respiratory concerns, vomiting or inability to swallow food/fluids at this time.        Review of Systems    See HPI    Objective      Vitals: /87 (BP Location: Right arm, Patient Position: Standing, Cuff Size: Adult Large)   Pulse 116   Temp 98.4  F (36.9  C) (Tympanic)   Wt 77.5 kg (170 lb 12.8 oz)   SpO2 97%   BMI 24.16 kg/m        Patient Vitals for the past 24 hrs:   BP Temp Temp src Pulse SpO2 Weight   12/26/22 1120 118/87 98.4  F (36.9  C) Tympanic 116 97 % 77.5 kg (170 lb 12.8 oz)       Vital signs reviewed by: Jeronimo Castillo PA-C    Physical Exam   Constitutional: Alert and active.  Non-toxic appearing.  No acute distress.  HENT:   Right Ear: External ear normal. TM: gray/pale appearing  Left Ear: External ear normal. TM: gray/pale appearing.  Nose: Nose normal.    Eyes: Conjunctivae, EOM and lids are normal.   Mouth: Mucous membranes are moist. Posterior oropharynx is erythematous. Exudates not present. Tonsils are not enlarged. Uvula is midline. No submandibular swelling or erythema.  Neck: Normal ROM. No meningismus.  No anterior or posterior chain cervical lymphadenopathy noted.  Cardiovascular: Regular rate and rhythm  Pulmonary/Chest: Effort normal. No respiratory distress. Lungs are clear to auscultation throughout.  GI: Abdomen is soft and non-tender throughout. No hepatosplenomegaly.  Musculoskeletal: Normal range of motion.   Neurological: Alert and appropriately oriented for age.   Skin: No rash noted on visualized skin.       Labs/Imaging:  Results for orders placed or performed in visit on 12/26/22   Streptococcus A Rapid Screen w/Reflex to PCR - Clinic Collect     Status: Abnormal    Specimen: Throat; Swab   Result Value Ref Range    Group A Strep antigen Positive (A) Negative   Influenza A & B Antigen - Clinic Collect     Status: Normal    Specimen: Nose; Swab   Result Value Ref Range    Influenza A antigen Negative Negative    Influenza B antigen Negative Negative    Narrative    Test results must be correlated with clinical data. If necessary, results should  be confirmed by a molecular assay or viral culture.         Jeronimo Castillo PA-C, December 26, 2022

## 2022-12-26 NOTE — LETTER
[facility] CARE [address] [address]          December 26, 2022    RE:  Deanne TIERNEY Jacky                                                                                                                                                       [address]  [facility] [id number]            To whom it may concern:    Deanne MONET Jacky is under my professional care for Strep pharyngitis She  may return to work with the following: The employee is UNABLE to return to work until 12/28/2022.    Sincerely,        Jeronimo Castillo PA-C

## 2023-01-14 ENCOUNTER — HEALTH MAINTENANCE LETTER (OUTPATIENT)
Age: 38
End: 2023-01-14

## 2023-08-28 ENCOUNTER — OFFICE VISIT (OUTPATIENT)
Dept: MIDWIFE SERVICES | Facility: CLINIC | Age: 38
End: 2023-08-28
Payer: COMMERCIAL

## 2023-08-28 VITALS
WEIGHT: 177.5 LBS | DIASTOLIC BLOOD PRESSURE: 70 MMHG | BODY MASS INDEX: 24.85 KG/M2 | HEART RATE: 80 BPM | HEIGHT: 71 IN | SYSTOLIC BLOOD PRESSURE: 100 MMHG

## 2023-08-28 DIAGNOSIS — Z01.419 WELL WOMAN EXAM WITH ROUTINE GYNECOLOGICAL EXAM: Primary | ICD-10-CM

## 2023-08-28 LAB
CHOLEST SERPL-MCNC: 229 MG/DL
HDLC SERPL-MCNC: 52 MG/DL
LDLC SERPL CALC-MCNC: 149 MG/DL
NONHDLC SERPL-MCNC: 177 MG/DL
TRIGL SERPL-MCNC: 138 MG/DL

## 2023-08-28 PROCEDURE — 99395 PREV VISIT EST AGE 18-39: CPT | Performed by: MIDWIFE

## 2023-08-28 PROCEDURE — G0145 SCR C/V CYTO,THINLAYER,RESCR: HCPCS | Performed by: MIDWIFE

## 2023-08-28 PROCEDURE — 36415 COLL VENOUS BLD VENIPUNCTURE: CPT | Performed by: MIDWIFE

## 2023-08-28 PROCEDURE — 80061 LIPID PANEL: CPT | Performed by: MIDWIFE

## 2023-08-28 PROCEDURE — 87624 HPV HI-RISK TYP POOLED RSLT: CPT | Performed by: MIDWIFE

## 2023-08-28 RX ORDER — AMOXICILLIN 500 MG/1
CAPSULE ORAL
COMMUNITY
Start: 2023-08-21 | End: 2024-09-09

## 2023-08-28 RX ORDER — CHLORAL HYDRATE 500 MG
1 CAPSULE ORAL
COMMUNITY

## 2023-08-28 ASSESSMENT — ANXIETY QUESTIONNAIRES
GAD7 TOTAL SCORE: 5
3. WORRYING TOO MUCH ABOUT DIFFERENT THINGS: SEVERAL DAYS
GAD7 TOTAL SCORE: 5
2. NOT BEING ABLE TO STOP OR CONTROL WORRYING: SEVERAL DAYS
1. FEELING NERVOUS, ANXIOUS, OR ON EDGE: SEVERAL DAYS
IF YOU CHECKED OFF ANY PROBLEMS ON THIS QUESTIONNAIRE, HOW DIFFICULT HAVE THESE PROBLEMS MADE IT FOR YOU TO DO YOUR WORK, TAKE CARE OF THINGS AT HOME, OR GET ALONG WITH OTHER PEOPLE: SOMEWHAT DIFFICULT
6. BECOMING EASILY ANNOYED OR IRRITABLE: SEVERAL DAYS
5. BEING SO RESTLESS THAT IT IS HARD TO SIT STILL: NOT AT ALL
7. FEELING AFRAID AS IF SOMETHING AWFUL MIGHT HAPPEN: NOT AT ALL

## 2023-08-28 ASSESSMENT — PATIENT HEALTH QUESTIONNAIRE - PHQ9
SUM OF ALL RESPONSES TO PHQ QUESTIONS 1-9: 4
5. POOR APPETITE OR OVEREATING: SEVERAL DAYS

## 2023-08-28 NOTE — PROGRESS NOTES
Annual Health Maintenance Exam    Subjective:     Deanne is a 38 year old female who presents for an annual exam.  She is an established patient to the [facility]. She reports having a healthy year.  She denies any concerns or problems.  Is seen by PCP for management of anxiety.  She reports having a healthy year.  She is in a mutually monogamous relationship with her  Farrukh.  They are using Paraguard IUD for birth control purposes.  Reports cycles every 23-28 days with some pink discharge between periods during ovulation.  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     Allergies  Patient has no known allergies.    Current Medications  Current Outpatient Medications   Medication Sig Dispense Refill    cholecalciferol, vitamin D3, (VITAMIN D3 ORAL) [CHOLECALCIFEROL, VITAMIN D3, (VITAMIN D3 ORAL)] Take by mouth. (Patient not taking: Reported on 2022)      copper (PARAGARD T 380A) 380 square mm IUD IUD [COPPER (PARAGARD T 380A) 380 SQUARE MM IUD IUD] 1 each by Intrauterine route once. (Patient not taking: Reported on 2022)      desvenlafaxine (PRISTIQ) 50 MG 24 hr tablet Take 1 tablet (50 mg) by mouth daily 30 tablet 12       Past Medical/Surgical History  No past medical history on file.  Past Surgical History:   Procedure Laterality Date    WISDOM TOOTH EXTRACTION      14 y/o       Obstetric and Gynecologic History  No LMP recorded.    OB History    Para Term  AB Living   4 4 4 0 0 4   SAB IAB Ectopic Multiple Live Births   0 0 0 0 4      # Outcome Date GA Lbr Pantera/2nd Weight Sex Delivery Anes PTL Lv   4 Term 10/10/16 40w2d  3.771 kg (8 lb 5 oz) M Vag-Spont  N MARTINEZ      Name: Christopher   3 Term 06/10/14 40w1d 07:45 / 00:05 3.26 kg (7 lb 3 oz) M Waterbirth None N MARTINEZ      Name: SHAQUILLE,MALE-DEANNE      Apgar1: 9  Apgar5: 9   2 Term 11   3.714 kg (8 lb 3 oz) F Vag-Spont None N MARTINEZ      Name: Patience   1 Term 08    3.714 kg (8 lb 3 oz) F Vag-Spont None N MARTINEZ      Name: Wesley       Last Pap: 2018.  Results were: NILM, HPV negative  Last mammogram: NA.     Immunization History  Status updated.    Family History  Family History   Problem Relation Age of Onset    No Known Problems Mother     Hypertension Father     Arthritis Father     No Known Problems Sister     No Known Problems Brother     Breast Cancer Maternal Aunt     Cancer Paternal Aunt     Arthritis Maternal Grandmother     Dementia Maternal Grandfather     No Known Problems Paternal Grandmother     Kidney Disease Paternal Grandfather        Health Maintenance  Diet:  general  Regular Exercise: Yes.  Classical stretch, hoga, walking.    Caffeine use:  yes  Sunscreen Use: Yes.   Dental Visits Regularly: Yes  Seat Belt: Yes  Self Breast Exam Monthly:Yes  Abuse History:  Does not have a history of abuse.  Currently does feel safe in all her relationships.      Social History  Social History     Socioeconomic History    Marital status:      Spouse name: Not on file    Number of children: 4    Years of education: college    Highest education level: Not on file   Occupational History    Not on file   Tobacco Use    Smoking status: Never    Smokeless tobacco: Never   Substance and Sexual Activity    Alcohol use: No    Drug use: No    Sexual activity: Yes     Partners: Male     Birth control/protection: I.U.D.   Other Topics Concern    Not on file   Social History Narrative    Not on file     Social Determinants of Health     Financial Resource Strain: Not on file   Food Insecurity: Not on file   Transportation Needs: Not on file   Physical Activity: Not on file   Stress: Not on file   Social Connections: Not on file   Intimate Partner Violence: Not on file   Housing Stability: Not on file       Further Screening History  Colonoscopy: NA.  Lipid Profile: due.  Glucose Screen: pregnancy, normal.  Last Dexa: NA.    Review of Systems  A 12 point comprehensive review of  systems was negative except as noted.    Objective:      [unfilled]  There were no vitals filed for this visit.    Physical Exam:  General: Pleasant, articulate, well-groomed, well-nourished female.  Not in any apparent distress.  Neck: Supple.  Thyroid: Small, symmetrical, no nodules noted.  Lymphadenopathy: Negative.  Lungs: Clear to auscultation bilaterally, and a nonlabored breathing pattern.  Cardio: Regular rate and rhythm, negative for murmur.   Breasts: Symmetrical, nontender, no masses, negative lymphadenopathy, negative nipple discharge.  Abdomen: Soft, nontender, no masses, negative CVAT.  External genitalia: Normal hair distribution, no lesions.  Urethral opening: Without lesions, or tenderness.   Bladder: Without masses, or tenderness.  Vagina: Pink, rugated, normal-appearing discharge.  Cervix: posterior, pink, smooth, no lesions.  Pap smear was obtained. IUD strings visible at external os, approximately 2 cm in length.  Bimanual: Finds uterus small anteverted, mobile, nontender, no masses.  Negative CMT.  Adnexa: without masses or tenderness.    Lower extremities: +1 reflexes, no significant edema    Assessment / Plan:     1) Annual health maintenance exam generally within normal limits today.  Pap smear collected today.  2) Anxiety- stable managed by PCP  3) Paraguard IUD in apparently proper positioning    1. Discussed nutrition and exercise.  Advised MVI, Vitamin D3 4000IU geltab and an omega 3 supplement daily.  Accepts the following HM labs: lipid profile.   Breast self exam technique reviewed and patient encouraged to perform self-exam monthly. Contraception: IUD.  Next pap due today. HPV co-testing discussed with that pap, then paps q 5 yrs if both negative.  2. Continue to follow with PCP for anxiety management.  3. Discussed checking strings monthly for IUD placement.  IUD due for removal and/or replacement in 2026.  4.  RTC 1 year for annual physical exam, or PRN.    TT = 40 minutes  of time of which >50% on education/counseling/care-coor

## 2023-08-30 LAB
BKR LAB AP GYN ADEQUACY: NORMAL
BKR LAB AP GYN INTERPRETATION: NORMAL
BKR LAB AP HPV REFLEX: NORMAL
BKR LAB AP LMP: NORMAL
BKR LAB AP PREVIOUS ABNORMAL: NORMAL
PATH REPORT.COMMENTS IMP SPEC: NORMAL
PATH REPORT.COMMENTS IMP SPEC: NORMAL
PATH REPORT.RELEVANT HX SPEC: NORMAL

## 2023-08-31 LAB
HUMAN PAPILLOMA VIRUS 16 DNA: NEGATIVE
HUMAN PAPILLOMA VIRUS 18 DNA: NEGATIVE
HUMAN PAPILLOMA VIRUS FINAL DIAGNOSIS: NORMAL
HUMAN PAPILLOMA VIRUS OTHER HR: NEGATIVE

## 2024-04-27 ENCOUNTER — HEALTH MAINTENANCE LETTER (OUTPATIENT)
Age: 39
End: 2024-04-27

## 2024-09-09 ENCOUNTER — OFFICE VISIT (OUTPATIENT)
Dept: MIDWIFE SERVICES | Facility: CLINIC | Age: 39
End: 2024-09-09
Payer: COMMERCIAL

## 2024-09-09 VITALS
WEIGHT: 178 LBS | BODY MASS INDEX: 24.92 KG/M2 | HEIGHT: 71 IN | HEART RATE: 64 BPM | SYSTOLIC BLOOD PRESSURE: 100 MMHG | DIASTOLIC BLOOD PRESSURE: 70 MMHG

## 2024-09-09 DIAGNOSIS — F43.21 SITUATIONAL DEPRESSION: ICD-10-CM

## 2024-09-09 DIAGNOSIS — Z01.419 WELL WOMAN EXAM WITH ROUTINE GYNECOLOGICAL EXAM: Primary | ICD-10-CM

## 2024-09-09 PROCEDURE — 99396 PREV VISIT EST AGE 40-64: CPT | Performed by: MIDWIFE

## 2024-09-09 ASSESSMENT — ANXIETY QUESTIONNAIRES
7. FEELING AFRAID AS IF SOMETHING AWFUL MIGHT HAPPEN: NOT AT ALL
GAD7 TOTAL SCORE: 4
GAD7 TOTAL SCORE: 4
8. IF YOU CHECKED OFF ANY PROBLEMS, HOW DIFFICULT HAVE THESE MADE IT FOR YOU TO DO YOUR WORK, TAKE CARE OF THINGS AT HOME, OR GET ALONG WITH OTHER PEOPLE?: SOMEWHAT DIFFICULT
GAD7 TOTAL SCORE: 4

## 2024-09-09 NOTE — PROGRESS NOTES
"Annual Health Maintenance Exam    Subjective:     Deanne is a 39 year old female who presents for an annual exam.  She is an established patient to the [facility].  She reports a healthy year. 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  Her  stays home with the children.  Deanne's father in law  this year from a brain tumor and this has been emotionally hard on the family. She continues to see a therapist twice a month.  She uses Pristiq for managing depression.  She has had challenges with refilling this medication with her primary provider.  She will be due for a refill in May, she may reach out for refills and prior auth completion as needed.  Reports feeling \"baby fever\" and may consider a child in the future, however she does have Paraguard in place now, due for removal in .  She cycles every 21-28 days, notes ovulation midcycle and occasionally has pink spotting/mucous with ovulation.  Deanne checks her IUD strings at home.  She denies SI and HI today.    Answers submitted by the patient for this visit:  Patient Health Questionnaire (G7) (Submitted on 2024)  HENNY 7 TOTAL SCORE: 4      Allergies  Patient has no known allergies.    Current Medications  Current Outpatient Medications   Medication Sig Dispense Refill    ASHWAGANDHA PO Take by mouth daily      copper (PARAGARD T 380A) 380 square mm IUD IUD 1 each by Intrauterine route once      desvenlafaxine (PRISTIQ) 50 MG 24 hr tablet Take 1 tablet (50 mg) by mouth daily 30 tablet 12    fish oil-omega-3 fatty acids 1000 MG capsule 1 capsule      Multiple Vitamin (MULTI VITAMIN) TABS 1 tablet Orally Once a day         Past Medical/Surgical History  No past medical history on file.  Past Surgical History:   Procedure Laterality Date    WISDOM TOOTH EXTRACTION      14 y/o       Obstetric and Gynecologic History  Patient's last menstrual period was 2024.    OB History    Para Term  AB Living   4 4 4 0 0 4   SAB " IAB Ectopic Multiple Live Births   0 0 0 0 4      # Outcome Date GA Lbr Pantera/2nd Weight Sex Type Anes PTL Lv   4 Term 10/10/16 40w2d  3.771 kg (8 lb 5 oz) M Vag-Spont  N MARTINEZ      Name: Christopher   3 Term 06/10/14 40w1d 07:45 / 00:05 3.26 kg (7 lb 3 oz) M Waterbirth None N MARTINEZ      Name: BIRD,MALE-DANA      Apgar1: 9  Apgar5: 9   2 Term 03/01/11   3.714 kg (8 lb 3 oz) F Vag-Spont None N MARTINEZ      Name: Patience   1 Term 12/05/08   3.714 kg (8 lb 3 oz) F Vag-Spont None N MARTINEZ      Name: Wesley       Last Pap: 2023.  Results were: NILM, neg HPV. Due 2028  Last mammogram: scheduled for next week.     Immunization History  Status updated.    Family History  Family History   Problem Relation Age of Onset    No Known Problems Mother     Hypertension Father     Arthritis Father     No Known Problems Sister     No Known Problems Brother     Breast Cancer Maternal Aunt     Cancer Paternal Aunt     Arthritis Maternal Grandmother     Dementia Maternal Grandfather     No Known Problems Paternal Grandmother     Kidney Disease Paternal Grandfather        Health Maintenance  Diet:  general  Regular Exercise: Yes.  Exercise videos in AM..    Caffeine use:  yes  Sunscreen Use: Yes.   Dental Visits Regularly: Yes  Seat Belt: Yes  Self Breast Exam Monthly:Yes  Abuse History:  Does not have a history of abuse.  Currently does feel safe in all her relationships.      Social History  Social History     Socioeconomic History    Marital status:      Spouse name: Not on file    Number of children: 4    Years of education: college    Highest education level: Not on file   Occupational History    Not on file   Tobacco Use    Smoking status: Never     Passive exposure: Never    Smokeless tobacco: Never   Vaping Use    Vaping status: Never Used   Substance and Sexual Activity    Alcohol use: Yes    Drug use: No    Sexual activity: Yes     Partners: Male     Birth control/protection: I.U.D.     Comment: Paragard   Other Topics Concern     "Not on file   Social History Narrative    Not on file     Social Determinants of Health     Financial Resource Strain: Not on file   Food Insecurity: Not on file   Transportation Needs: Not on file   Physical Activity: Not on file   Stress: Not on file   Social Connections: Not on file   Interpersonal Safety: Low Risk  (9/9/2024)    Interpersonal Safety     Do you feel physically and emotionally safe where you currently live?: Yes     Within the past 12 months, have you been hit, slapped, kicked or otherwise physically hurt by someone?: No     Within the past 12 months, have you been humiliated or emotionally abused in other ways by your partner or ex-partner?: No   Housing Stability: Not on file       Further Screening History  Colonoscopy: NA.  Lipid Profile: 2023, elevated.  Glucose Screen: NA.  Last Dexa: NA.    Review of Systems  A 12 point comprehensive review of systems was negative except as noted.    Objective:     Vitals:    09/09/24 0813   BP: 100/70   Pulse: 64   Weight: 80.7 kg (178 lb)   Height: 1.791 m (5' 10.5\")       Physical Exam:  General: Pleasant, articulate, well-groomed, well-nourished female.  Not in any apparent distress.  Neck: Supple.  Thyroid: Small, symmetrical, no nodules noted.  Lymphadenopathy: Negative.  Lungs: Clear to auscultation bilaterally, and a nonlabored breathing pattern.  Cardio: Regular rate and rhythm, negative for murmur.   Breasts: Symmetrical, nontender, no masses, negative lymphadenopathy, negative nipple discharge.  Abdomen: Soft, nontender, no masses, negative CVAT.  Declines pelvic exam today due to menses.  Lower extremities: +1 reflexes, no significant edema.      Assessment / Plan:     1) Annual health maintenance exam generally within normal limits today.  Pap smear due 2028.  2) Situational depression/anxiety-stable    1. Discussed nutrition and exercise.  Advised MVI, Vitamin D3 4000IU geltab and an omega 3 supplement daily.  Accepts the following  labs: " none today.   Breast self exam technique reviewed and patient encouraged to perform self-exam monthly. Contraception: Paraguard.  Next pap due 2028. HPV co-testing discussed with that pap, then paps q 5 yrs if both negative.  2. Continue therapy and medication utilization.  Contact clinic if in need of refill for Pristiq.  3. Mammogram as scheduled.  4.  RTC 1 year for annual physical exam, or PRN.    40 minutes on the date of the encounter doing chart review, review of outside records, review of test results, patient visit, and documentation

## 2024-09-17 ENCOUNTER — ANCILLARY PROCEDURE (OUTPATIENT)
Dept: MAMMOGRAPHY | Facility: CLINIC | Age: 39
End: 2024-09-17
Attending: FAMILY MEDICINE
Payer: COMMERCIAL

## 2024-09-17 DIAGNOSIS — Z12.31 VISIT FOR SCREENING MAMMOGRAM: ICD-10-CM

## 2024-09-17 PROCEDURE — 77067 SCR MAMMO BI INCL CAD: CPT | Mod: TC | Performed by: RADIOLOGY

## 2024-09-17 PROCEDURE — 77063 BREAST TOMOSYNTHESIS BI: CPT | Mod: TC | Performed by: RADIOLOGY

## 2024-09-29 ENCOUNTER — OFFICE VISIT (OUTPATIENT)
Dept: URGENT CARE | Facility: URGENT CARE | Age: 39
End: 2024-09-29
Payer: COMMERCIAL

## 2024-09-29 VITALS
TEMPERATURE: 98.3 F | BODY MASS INDEX: 24.47 KG/M2 | RESPIRATION RATE: 18 BRPM | OXYGEN SATURATION: 99 % | WEIGHT: 173 LBS | SYSTOLIC BLOOD PRESSURE: 110 MMHG | HEART RATE: 97 BPM | DIASTOLIC BLOOD PRESSURE: 78 MMHG

## 2024-09-29 DIAGNOSIS — J02.9 SORE THROAT: Primary | ICD-10-CM

## 2024-09-29 LAB
DEPRECATED S PYO AG THROAT QL EIA: NEGATIVE
GROUP A STREP BY PCR: NOT DETECTED

## 2024-09-29 PROCEDURE — 87635 SARS-COV-2 COVID-19 AMP PRB: CPT | Performed by: NURSE PRACTITIONER

## 2024-09-29 PROCEDURE — 99213 OFFICE O/P EST LOW 20 MIN: CPT | Performed by: NURSE PRACTITIONER

## 2024-09-29 PROCEDURE — 87651 STREP A DNA AMP PROBE: CPT | Performed by: NURSE PRACTITIONER

## 2024-09-29 ASSESSMENT — PAIN SCALES - GENERAL: PAINLEVEL: MILD PAIN (3)

## 2024-09-29 NOTE — PROGRESS NOTES
"Assessment & Plan     1. Sore throat  Pending strep culture  Covid pending will self isolate  Drink plenty of fluids and rest.  May use salt water gargles- about 8 oz warm water with about 1 teaspoon salt  Sucrets and Cepacol spray are over the counter medications that numb the throat.  Over the counter pain relievers such as tylenol or ibuprofen may be used as needed.   Honey lemon tea helps to soothe the throat. \"Throat Coat\" tea is soothing as well.  If culture is positive will send in antibiotic will change toothbrush after 24 hours of antibiotics (may soak in 3-6% hydrogen peroxide)  Will be contagious for 24 hours after starting antibiotic  May return to school//work/activities 24 hours after antibiotics are started.  Wash hands frequently and do not share beverages.  Please follow up with primary care provider if symptoms are not improving, worsening or new symptoms or for any adverse reactions to medications.     - Symptomatic COVID-19 Virus (Coronavirus) by PCR Nose  - Streptococcus A Rapid Screen w/Reflex to PCR - Clinic Collect  - Group A Streptococcus PCR Throat Swab      [facility] C[facility]    Samson Alicea is a 39 year old female who presents to clinic today for the following health issues:  Chief Complaint   Patient presents with    Pharyngitis     Sore throat started yesterday        HPI      URI Adult    Onset of symptoms was 1 day(s) ago.  Course of illness is improving.    Severity mild  Current and Associated symptoms: sore throat  Treatment measures tried include Fluids and Rest.  Predisposing factors include None.      Review of Systems  Constitutional, HEENT, cardiovascular, pulmonary, gi and gu systems are negative, except as otherwise noted.      Objective    /78 (BP Location: Left arm, Patient Position: Sitting, Cuff Size: Adult Regular)   Pulse 97   Temp 98.3  F (36.8  C) (Oral)   Resp 18   Wt 78.5 kg (173 lb)   " LMP 09/07/2024   SpO2 99%   BMI 24.47 kg/m    Physical Exam   GENERAL: alert and no distress  EYES: Eyes grossly normal to inspection, PERRL and conjunctivae and sclerae normal  HENT: normal cephalic/atraumatic, ear canals and TM's normal, nose and mouth without ulcers or lesions, oropharynx clear, oral mucous membranes moist, and tonsillar erythema  NECK: bilateral anterior cervical adenopathy, no asymmetry, masses, or scars, and thyroid normal to palpation  RESP: lungs clear to auscultation - no rales, rhonchi or wheezes  CV: regular rate and rhythm, normal S1 S2, no S3 or S4, no murmur, click or rub, no peripheral edema  ABDOMEN: soft, nontender, no hepatosplenomegaly, no masses and bowel sounds normal  MS: no gross musculoskeletal defects noted, no edema    Results for orders placed or performed in visit on 09/29/24   Streptococcus A Rapid Screen w/Reflex to PCR - Clinic Collect     Status: Normal    Specimen: Throat; Swab   Result Value Ref Range    Group A Strep antigen Negative Negative

## 2024-09-30 LAB — SARS-COV-2 RNA RESP QL NAA+PROBE: NEGATIVE

## 2025-02-12 ENCOUNTER — OFFICE VISIT (OUTPATIENT)
Dept: URGENT CARE | Facility: URGENT CARE | Age: 40
End: 2025-02-12
Payer: COMMERCIAL

## 2025-02-12 VITALS
OXYGEN SATURATION: 96 % | SYSTOLIC BLOOD PRESSURE: 136 MMHG | WEIGHT: 178 LBS | RESPIRATION RATE: 18 BRPM | DIASTOLIC BLOOD PRESSURE: 85 MMHG | TEMPERATURE: 98.5 F | BODY MASS INDEX: 25.18 KG/M2 | HEART RATE: 90 BPM

## 2025-02-12 DIAGNOSIS — R07.0 THROAT PAIN: ICD-10-CM

## 2025-02-12 DIAGNOSIS — J11.1 INFLUENZA-LIKE ILLNESS: Primary | ICD-10-CM

## 2025-02-12 LAB
DEPRECATED S PYO AG THROAT QL EIA: NEGATIVE
S PYO DNA THROAT QL NAA+PROBE: NOT DETECTED

## 2025-02-12 PROCEDURE — 87651 STREP A DNA AMP PROBE: CPT | Performed by: PHYSICIAN ASSISTANT

## 2025-02-12 PROCEDURE — 99213 OFFICE O/P EST LOW 20 MIN: CPT | Performed by: PHYSICIAN ASSISTANT

## 2025-02-12 NOTE — PROGRESS NOTES
"Chief Complaint   Patient presents with    Urgent Care    Throat Problem     Per patient states symptoms started a couple of days ago having sore throat, cough-dry, with some chill, sweats and headaches        Assessment & Plan  Assessment  Influenza like illness or other viral URI is the likely diagnosis, given the symptoms of sore throat, malaise, and fatigue. The negative strep test supports this assessment. However, a PCR test for strep is pending to rule out any missed bacterial infection.    Plan  - Use Tylenol, ibuprofen, and Mucinex for symptom relief.  - Send strep test for PCR to confirm diagnosis.  - Wear a mask to reduce transmission     ICD-10-CM    1. Influenza-like illness  J11.1       2. Throat pain  R07.0 Streptococcus A Rapid Screen w/Reflex to PCR - Clinic Collect     Group A Streptococcus PCR Throat Swab          SUBJECTIVE:  History of Present Illness-Deanne Rico, a 39-year-old female, reports the onset of symptoms beginning on Monday, February 10, 2025, when she stayed home due to feeling a cough developing and experiencing a sore throat. She describes her throat as feeling \"yucky\" and mentions that the cough has progressively worsened. She has not been very active or talking much, which initially helped manage the cough, but it intensified while driving to the emergency department. She performed a COVID-19 test at home last night, which was negative. She reports experiencing mild fever and chills during the night, although these symptoms were not consistently present. 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     OBJECTIVE:  /85   Pulse 90   Temp 98.5  F (36.9  C) (Oral)   Resp 18   Wt 80.7 kg (178 lb)   LMP 02/05/2025   SpO2 96%   BMI 25.18 kg/m     Physical Exam  - HEENT: Mild erythema of the throat, no edema observed.  - CARDIOVASCULAR: Normal heart sounds.  - LUNGS: " Bilateral clear lung fields.  - LYMPHATIC: No cervical lymphadenopathy.       DIAGNOSTICS    Results- Strep test: Negative  Results for orders placed or performed in visit on 02/12/25   Streptococcus A Rapid Screen w/Reflex to PCR - Clinic Collect     Status: Normal    Specimen: Throat; Swab   Result Value Ref Range    Group A Strep antigen Negative Negative        John Vargas PA-C    Consent was obtained from the patient to use an AI documentation tool in the creation of this note.  Any grammatical or spelling errors are non-intentional. Please contact the author of this note directly if you are in need of any clarification.     Current Outpatient Medications   Medication Sig Dispense Refill    ASHWAGANDHA PO Take by mouth daily      copper (PARAGARD T 380A) 380 square mm IUD IUD 1 each by Intrauterine route once      desvenlafaxine (PRISTIQ) 50 MG 24 hr tablet Take 1 tablet (50 mg) by mouth daily 30 tablet 12    fish oil-omega-3 fatty acids 1000 MG capsule 1 capsule      Multiple Vitamin (MULTI VITAMIN) TABS 1 tablet Orally Once a day       No current facility-administered medications for this visit.      Patient Active Problem List   Diagnosis    Situational depression      No past medical history on file.  Past Surgical History:   Procedure Laterality Date    WISDOM TOOTH EXTRACTION      14 y/o     Family History   Problem Relation Age of Onset    No Known Problems Mother     Hypertension Father     Arthritis Father     No Known Problems Sister     No Known Problems Brother     Breast Cancer Maternal Aunt     Cancer Paternal Aunt     Arthritis Maternal Grandmother     Dementia Maternal Grandfather     No Known Problems Paternal Grandmother     Kidney Disease Paternal Grandfather      Social History     Tobacco Use    Smoking status: Never     Passive exposure: Never    Smokeless tobacco: Never   Substance Use Topics    Alcohol use: Yes

## 2025-07-09 ENCOUNTER — LAB REQUISITION (OUTPATIENT)
Dept: LAB | Facility: CLINIC | Age: 40
End: 2025-07-09
Payer: COMMERCIAL

## 2025-07-09 DIAGNOSIS — R60.0 LOCALIZED EDEMA: ICD-10-CM

## 2025-07-09 LAB — ERYTHROCYTE [SEDIMENTATION RATE] IN BLOOD BY WESTERGREN METHOD: 15 MM/HR (ref 0–20)

## 2025-07-09 PROCEDURE — 80053 COMPREHEN METABOLIC PANEL: CPT | Mod: ORL | Performed by: STUDENT IN AN ORGANIZED HEALTH CARE EDUCATION/TRAINING PROGRAM

## 2025-07-09 PROCEDURE — 82043 UR ALBUMIN QUANTITATIVE: CPT | Mod: ORL | Performed by: STUDENT IN AN ORGANIZED HEALTH CARE EDUCATION/TRAINING PROGRAM

## 2025-07-09 PROCEDURE — 84443 ASSAY THYROID STIM HORMONE: CPT | Mod: ORL | Performed by: STUDENT IN AN ORGANIZED HEALTH CARE EDUCATION/TRAINING PROGRAM

## 2025-07-10 LAB
ALBUMIN SERPL BCG-MCNC: 4.3 G/DL (ref 3.5–5.2)
ALP SERPL-CCNC: 73 U/L (ref 40–150)
ALT SERPL W P-5'-P-CCNC: 111 U/L (ref 0–50)
ANION GAP SERPL CALCULATED.3IONS-SCNC: 12 MMOL/L (ref 7–15)
AST SERPL W P-5'-P-CCNC: 67 U/L (ref 0–45)
B BURGDOR IGG+IGM SER QL: 0.26
BILIRUB SERPL-MCNC: 0.2 MG/DL
BUN SERPL-MCNC: 10.5 MG/DL (ref 6–20)
CALCIUM SERPL-MCNC: 9.3 MG/DL (ref 8.8–10.4)
CHLORIDE SERPL-SCNC: 100 MMOL/L (ref 98–107)
CREAT SERPL-MCNC: 0.76 MG/DL (ref 0.51–0.95)
CREAT UR-MCNC: 68.6 MG/DL
CRP SERPL-MCNC: 3.19 MG/L
EGFRCR SERPLBLD CKD-EPI 2021: >90 ML/MIN/1.73M2
GLUCOSE SERPL-MCNC: 91 MG/DL (ref 70–99)
HCO3 SERPL-SCNC: 24 MMOL/L (ref 22–29)
MICROALBUMIN UR-MCNC: <12 MG/L
MICROALBUMIN/CREAT UR: NORMAL MG/G{CREAT}
POTASSIUM SERPL-SCNC: 3.9 MMOL/L (ref 3.4–5.3)
PROT SERPL-MCNC: 6.7 G/DL (ref 6.4–8.3)
SODIUM SERPL-SCNC: 136 MMOL/L (ref 135–145)
TSH SERPL DL<=0.005 MIU/L-ACNC: 1.79 UIU/ML (ref 0.3–4.2)

## 2025-07-19 ENCOUNTER — MYC MEDICAL ADVICE (OUTPATIENT)
Dept: MIDWIFE SERVICES | Facility: CLINIC | Age: 40
End: 2025-07-19
Payer: COMMERCIAL

## 2025-07-19 DIAGNOSIS — F41.9 ANXIETY: ICD-10-CM

## 2025-07-21 NOTE — TELEPHONE ENCOUNTER
"OV 9/9/24 noted:  \"She uses Pristiq for managing depression. She has had challenges with refilling this medication with her primary provider. She will be due for a refill in May, she may reach out for refills and prior auth completion as needed. \"  "

## 2025-07-22 RX ORDER — DESVENLAFAXINE 50 MG/1
50 TABLET, FILM COATED, EXTENDED RELEASE ORAL DAILY
Qty: 30 TABLET | Refills: 2 | Status: SHIPPED | OUTPATIENT
Start: 2025-07-22